# Patient Record
Sex: FEMALE | Race: WHITE | NOT HISPANIC OR LATINO | Employment: OTHER | URBAN - METROPOLITAN AREA
[De-identification: names, ages, dates, MRNs, and addresses within clinical notes are randomized per-mention and may not be internally consistent; named-entity substitution may affect disease eponyms.]

---

## 2023-05-16 ENCOUNTER — TELEPHONE (OUTPATIENT)
Dept: PRIMARY CARE | Facility: CLINIC | Age: 73
End: 2023-05-16
Payer: COMMERCIAL

## 2023-05-16 DIAGNOSIS — R06.02 SOB (SHORTNESS OF BREATH): ICD-10-CM

## 2023-05-17 RX ORDER — ALBUTEROL SULFATE 90 UG/1
2 AEROSOL, METERED RESPIRATORY (INHALATION) EVERY 6 HOURS PRN
Qty: 18 G | Refills: 0 | Status: SHIPPED | OUTPATIENT
Start: 2023-05-17 | End: 2023-07-25 | Stop reason: SDUPTHER

## 2023-05-17 RX ORDER — ALBUTEROL SULFATE 90 UG/1
AEROSOL, METERED RESPIRATORY (INHALATION)
COMMUNITY
Start: 2022-06-15 | End: 2023-05-17 | Stop reason: SDUPTHER

## 2023-06-01 ENCOUNTER — TELEPHONE (OUTPATIENT)
Dept: PRIMARY CARE | Facility: CLINIC | Age: 73
End: 2023-06-01

## 2023-06-04 DIAGNOSIS — Z00.00 HEALTH CARE MAINTENANCE: ICD-10-CM

## 2023-06-04 DIAGNOSIS — E07.9 THYROID DISORDER: ICD-10-CM

## 2023-06-05 RX ORDER — GABAPENTIN 300 MG/1
900 CAPSULE ORAL 3 TIMES DAILY
COMMUNITY
End: 2023-06-05 | Stop reason: SDUPTHER

## 2023-06-06 RX ORDER — GABAPENTIN 300 MG/1
900 CAPSULE ORAL 3 TIMES DAILY
Qty: 810 CAPSULE | Refills: 0 | Status: SHIPPED | OUTPATIENT
Start: 2023-06-06 | End: 2023-06-23 | Stop reason: SDUPTHER

## 2023-06-06 RX ORDER — LEVOTHYROXINE SODIUM 75 UG/1
TABLET ORAL
Qty: 90 TABLET | Refills: 0 | Status: SHIPPED | OUTPATIENT
Start: 2023-06-06 | End: 2023-08-31

## 2023-06-23 DIAGNOSIS — Z00.00 HEALTH CARE MAINTENANCE: ICD-10-CM

## 2023-06-23 RX ORDER — GABAPENTIN 300 MG/1
300 CAPSULE ORAL 3 TIMES DAILY
Qty: 270 CAPSULE | Refills: 0 | Status: SHIPPED | OUTPATIENT
Start: 2023-06-23 | End: 2023-10-05 | Stop reason: SDUPTHER

## 2023-07-25 ENCOUNTER — OFFICE VISIT (OUTPATIENT)
Dept: PRIMARY CARE | Facility: CLINIC | Age: 73
End: 2023-07-25
Payer: COMMERCIAL

## 2023-07-25 VITALS
WEIGHT: 115 LBS | BODY MASS INDEX: 17.43 KG/M2 | OXYGEN SATURATION: 94 % | HEIGHT: 68 IN | HEART RATE: 68 BPM | SYSTOLIC BLOOD PRESSURE: 80 MMHG | TEMPERATURE: 96.9 F | DIASTOLIC BLOOD PRESSURE: 65 MMHG

## 2023-07-25 DIAGNOSIS — J43.2 CENTRILOBULAR EMPHYSEMA (MULTI): ICD-10-CM

## 2023-07-25 DIAGNOSIS — I48.20 CHRONIC ATRIAL FIBRILLATION (MULTI): ICD-10-CM

## 2023-07-25 DIAGNOSIS — Z00.00 VISIT FOR PREVENTIVE HEALTH EXAMINATION: Primary | ICD-10-CM

## 2023-07-25 DIAGNOSIS — I50.32 CHRONIC DIASTOLIC CONGESTIVE HEART FAILURE (MULTI): ICD-10-CM

## 2023-07-25 DIAGNOSIS — R63.4 WEIGHT LOSS: ICD-10-CM

## 2023-07-25 DIAGNOSIS — R06.02 SOB (SHORTNESS OF BREATH): ICD-10-CM

## 2023-07-25 DIAGNOSIS — E03.9 HYPOTHYROIDISM, UNSPECIFIED TYPE: ICD-10-CM

## 2023-07-25 PROBLEM — M54.16 LEFT LUMBAR RADICULOPATHY: Status: ACTIVE | Noted: 2023-07-25

## 2023-07-25 PROBLEM — F41.8 ANXIETY ASSOCIATED WITH DEPRESSION: Status: ACTIVE | Noted: 2023-07-25

## 2023-07-25 PROBLEM — K21.9 GERD (GASTROESOPHAGEAL REFLUX DISEASE): Status: ACTIVE | Noted: 2023-07-25

## 2023-07-25 PROBLEM — I50.30 DIASTOLIC CHF (MULTI): Status: ACTIVE | Noted: 2023-07-25

## 2023-07-25 PROBLEM — E78.2 HYPERLIPIDEMIA, MIXED: Status: ACTIVE | Noted: 2023-07-25

## 2023-07-25 PROBLEM — F17.200 NICOTINE DEPENDENCE: Status: ACTIVE | Noted: 2023-07-25

## 2023-07-25 PROBLEM — M51.26 LUMBAR HERNIATED DISC: Status: ACTIVE | Noted: 2023-07-25

## 2023-07-25 PROBLEM — E55.9 MILD VITAMIN D DEFICIENCY: Status: ACTIVE | Noted: 2023-07-25

## 2023-07-25 PROBLEM — I10 BENIGN ESSENTIAL HYPERTENSION: Status: ACTIVE | Noted: 2023-07-25

## 2023-07-25 PROCEDURE — 3078F DIAST BP <80 MM HG: CPT | Performed by: FAMILY MEDICINE

## 2023-07-25 PROCEDURE — 99214 OFFICE O/P EST MOD 30 MIN: CPT | Performed by: FAMILY MEDICINE

## 2023-07-25 PROCEDURE — 1170F FXNL STATUS ASSESSED: CPT | Performed by: FAMILY MEDICINE

## 2023-07-25 PROCEDURE — G0439 PPPS, SUBSEQ VISIT: HCPCS | Performed by: FAMILY MEDICINE

## 2023-07-25 PROCEDURE — 4004F PT TOBACCO SCREEN RCVD TLK: CPT | Performed by: FAMILY MEDICINE

## 2023-07-25 PROCEDURE — 3074F SYST BP LT 130 MM HG: CPT | Performed by: FAMILY MEDICINE

## 2023-07-25 PROCEDURE — 1160F RVW MEDS BY RX/DR IN RCRD: CPT | Performed by: FAMILY MEDICINE

## 2023-07-25 PROCEDURE — 1159F MED LIST DOCD IN RCRD: CPT | Performed by: FAMILY MEDICINE

## 2023-07-25 PROCEDURE — 1157F ADVNC CARE PLAN IN RCRD: CPT | Performed by: FAMILY MEDICINE

## 2023-07-25 PROCEDURE — 99497 ADVNCD CARE PLAN 30 MIN: CPT | Performed by: FAMILY MEDICINE

## 2023-07-25 RX ORDER — CHLORTHALIDONE 25 MG/1
1 TABLET ORAL DAILY
COMMUNITY
Start: 2021-03-04 | End: 2023-07-27 | Stop reason: ALTCHOICE

## 2023-07-25 RX ORDER — ALBUTEROL SULFATE 90 UG/1
2 AEROSOL, METERED RESPIRATORY (INHALATION) EVERY 6 HOURS PRN
Qty: 18 G | Refills: 0 | Status: SHIPPED | OUTPATIENT
Start: 2023-07-25 | End: 2024-01-03 | Stop reason: ALTCHOICE

## 2023-07-25 RX ORDER — MIRTAZAPINE 7.5 MG/1
7.5 TABLET, FILM COATED ORAL NIGHTLY
Qty: 30 TABLET | Refills: 11 | Status: SHIPPED | OUTPATIENT
Start: 2023-07-25 | End: 2024-01-03 | Stop reason: ALTCHOICE

## 2023-07-25 RX ORDER — METOPROLOL SUCCINATE 25 MG/1
1 TABLET, EXTENDED RELEASE ORAL DAILY
COMMUNITY
Start: 2020-02-17 | End: 2023-12-20

## 2023-07-25 RX ORDER — ATORVASTATIN CALCIUM 40 MG/1
1 TABLET, FILM COATED ORAL DAILY
COMMUNITY
Start: 2018-01-18 | End: 2023-10-02

## 2023-07-25 RX ORDER — BIOTIN 10 MG
TABLET ORAL
COMMUNITY
Start: 2021-09-16

## 2023-07-25 RX ORDER — NEOMYCIN SULFATE, POLYMYXIN B SULFATE, HYDROCORTISONE 3.5; 10000; 1 MG/ML; [USP'U]/ML; MG/ML
2 SOLUTION/ DROPS AURICULAR (OTIC) 4 TIMES DAILY
Qty: 10 ML | Refills: 0 | Status: SHIPPED | OUTPATIENT
Start: 2023-07-25 | End: 2023-08-01

## 2023-07-25 RX ORDER — NALOXONE HYDROCHLORIDE 4 MG/.1ML
SPRAY NASAL
COMMUNITY
Start: 2022-04-07

## 2023-07-25 RX ORDER — ACETAMINOPHEN 500 MG
TABLET ORAL
COMMUNITY
Start: 2021-02-16

## 2023-07-25 RX ORDER — APIXABAN 5 MG/1
5 TABLET, FILM COATED ORAL 2 TIMES DAILY
COMMUNITY
End: 2023-08-31 | Stop reason: SDUPTHER

## 2023-07-25 RX ORDER — ESCITALOPRAM OXALATE 20 MG/1
1 TABLET ORAL DAILY
COMMUNITY
Start: 2018-01-18 | End: 2024-01-03 | Stop reason: ALTCHOICE

## 2023-07-25 RX ORDER — OXYCODONE AND ACETAMINOPHEN 10; 325 MG/1; MG/1
TABLET ORAL
COMMUNITY
End: 2023-10-05 | Stop reason: SDUPTHER

## 2023-07-25 RX ORDER — LISINOPRIL 40 MG/1
1 TABLET ORAL DAILY
COMMUNITY
End: 2023-07-27 | Stop reason: ALTCHOICE

## 2023-07-25 RX ORDER — PANTOPRAZOLE SODIUM 40 MG/1
1 TABLET, DELAYED RELEASE ORAL DAILY
COMMUNITY
Start: 2020-02-17 | End: 2023-12-20

## 2023-07-25 ASSESSMENT — ACTIVITIES OF DAILY LIVING (ADL)
GROCERY_SHOPPING: INDEPENDENT
DRESSING: INDEPENDENT
BATHING: INDEPENDENT
DOING_HOUSEWORK: INDEPENDENT
MANAGING_FINANCES: INDEPENDENT
TAKING_MEDICATION: INDEPENDENT

## 2023-07-25 ASSESSMENT — LIFESTYLE VARIABLES
HOW MANY STANDARD DRINKS CONTAINING ALCOHOL DO YOU HAVE ON A TYPICAL DAY: PATIENT DOES NOT DRINK
HOW OFTEN DO YOU HAVE A DRINK CONTAINING ALCOHOL: NEVER
HOW OFTEN DO YOU HAVE SIX OR MORE DRINKS ON ONE OCCASION: NEVER
AUDIT-C TOTAL SCORE: 0
SKIP TO QUESTIONS 9-10: 1

## 2023-07-25 ASSESSMENT — PATIENT HEALTH QUESTIONNAIRE - PHQ9
1. LITTLE INTEREST OR PLEASURE IN DOING THINGS: NOT AT ALL
SUM OF ALL RESPONSES TO PHQ9 QUESTIONS 1 AND 2: 0
2. FEELING DOWN, DEPRESSED OR HOPELESS: NOT AT ALL

## 2023-07-25 ASSESSMENT — ENCOUNTER SYMPTOMS
LOSS OF SENSATION IN FEET: 0
OCCASIONAL FEELINGS OF UNSTEADINESS: 1
DEPRESSION: 0

## 2023-07-25 NOTE — PROGRESS NOTES
"Subjective   Reason for Visit: Abi Weston is an 73 y.o. female here for a Medicare Wellness visit.     Past Medical, Surgical, and Family History reviewed and updated in chart.    Reviewed all medications by prescribing practitioner or clinical pharmacist (such as prescriptions, OTCs, herbal therapies and supplements) and documented in the medical record.    HPI    Patient Care Team:  Jhonathan Aquino MD as PCP - General     Review of Systems    Objective   Vitals:  BP 80/65 (BP Location: Left arm, Patient Position: Sitting)   Pulse 68   Temp 36.1 °C (96.9 °F)   Ht 1.727 m (5' 8\")   Wt 52.2 kg (115 lb)   SpO2 94%   BMI 17.49 kg/m²       Physical Exam    Assessment/Plan   Problem List Items Addressed This Visit       Chronic atrial fibrillation (CMS/HCC)    Relevant Medications    metoprolol succinate XL (Toprol-XL) 25 mg 24 hr tablet    Diastolic CHF (CMS/HCC)    Relevant Medications    metoprolol succinate XL (Toprol-XL) 25 mg 24 hr tablet    Other Relevant Orders    Urinalysis Microscopic Only    TSH with reflex to Free T4 if abnormal    Lipid Panel    Comprehensive Metabolic Panel    CBC    Hypothyroidism    Relevant Orders    TSH with reflex to Free T4 if abnormal    Lipid Panel     Other Visit Diagnoses       Visit for preventive health examination    -  Primary    Relevant Orders    Urinalysis Microscopic Only    TSH with reflex to Free T4 if abnormal    Lipid Panel    Comprehensive Metabolic Panel    CBC    SOB (shortness of breath)        Relevant Medications    albuterol 90 mcg/actuation inhaler    Centrilobular emphysema (CMS/HCC)        Relevant Medications    albuterol 90 mcg/actuation inhaler               "

## 2023-07-25 NOTE — PROGRESS NOTES
Subjective   Patient ID: Abi Weston is a 73 y.o. female who presents for Medicare Annual Wellness Visit Subsequent.    Assessment/Plan     Problem List Items Addressed This Visit       Chronic atrial fibrillation (CMS/HCC)    Relevant Medications    metoprolol succinate XL (Toprol-XL) 25 mg 24 hr tablet    Diastolic CHF (CMS/HCC)    Relevant Medications    metoprolol succinate XL (Toprol-XL) 25 mg 24 hr tablet    Other Relevant Orders    Urinalysis Microscopic Only    TSH with reflex to Free T4 if abnormal    Lipid Panel    Comprehensive Metabolic Panel    CBC    Hypothyroidism    Relevant Orders    TSH with reflex to Free T4 if abnormal    Lipid Panel     Other Visit Diagnoses       Visit for preventive health examination    -  Primary    Relevant Medications    neomycin-polymyxin-HC (Cortisporin) otic solution    Other Relevant Orders    Urinalysis Microscopic Only    TSH with reflex to Free T4 if abnormal    Lipid Panel    Comprehensive Metabolic Panel    CBC    SOB (shortness of breath)        Relevant Medications    albuterol 90 mcg/actuation inhaler    Centrilobular emphysema (CMS/HCC)        Relevant Medications    albuterol 90 mcg/actuation inhaler    Weight loss        Relevant Medications    mirtazapine (Remeron) 7.5 mg tablet        Continue current medication  Hold Lasix  Follow-up with cardiology  Advised to stay away from smoking  Continue atorvastatin 40 mg  Follow-up in 4 months  Mammogram April 2022 within normal limits  Scan showed osteopenia in April 2022  Declined flu vaccine      Up-to-date with pneumonia vaccine  New shingles vaccine patient declined for now  Colonoscopy January 2019 and repeat in 5 years hyperplastic polyps  Follow-up in 6 months  Come back early with any new sign and symptoms patient understands and agreement plan.     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 Rossi  Status post lumbar spinal fusion and decompression - was seen by neurosurgery Dr. Ramos - history of spinal surgery Ã--2 - following up with pain management  Chronic pain syndrome - lower back pain - following up with pain management  Osteoarthritis  Status post right carpal tunnel surgery feeling better  GERD  Diastolic CHF     Started smoking again advised patient to stop smoking  Following up with cardiology and pain management  Still smoking     Left ear itching  Complaining of weight loss we will consider low-dose of Remeron 7.5 mg risk-benefit discussed  Advance directives:. Advanced Care Planning discussed and documented advance care plan or surrogate decision maker documented in the medical record.   A Conversation about Advance directives of at least 16 minutes has occurred. Actual time spent: I spent >16 minutes discussing Advance Care Planning including the explanation and discussion of advance directives. If patient does not have current up to date documents, examples and information provided on how to create both Living Will and Power of . Patient was encouraged to work on completing these documents.  Conversation with: The conversation with the patient and/or participants was voluntary.  Documents discussed and/or completed:  Living Will was discussed with participants. Healthcare POA was discussed with participants. Patient educated on how to obtain Living Will and Power of . Discussed patient end of life choices.   Patient has living will and patient's daughter liudmila is healthcare power of .  Patient is full code.      No Known Allergies    Current Outpatient Medications   Medication Sig Dispense Refill    atorvastatin (Lipitor) 40 mg tablet Take 1 tablet (40 mg) by mouth once daily.      biotin 10 mg tablet Take by mouth.      chlorthalidone (Hygroton) 25 mg tablet Take 1 tablet (25 mg) by mouth once daily.      cholecalciferol (Vitamin D-3) 50 mcg (2,000 unit) capsule Take by mouth.      Eliquis 5 mg tablet Take 1 tablet (5 mg) by mouth 2 times a day.      escitalopram (Lexapro) 20 mg tablet Take 1 tablet (20 mg) by mouth once daily.      gabapentin (Neurontin) 300 mg capsule Take 1 capsule (300 mg) by mouth 3 times a day. 270 capsule 0    levothyroxine (Synthroid, Levoxyl) 75 mcg tablet TAKE 1 TABLET DAILY AS     DIRECTED 90 tablet 0    lisinopril 40 mg tablet Take 1 tablet (40 mg) by mouth once daily.      metoprolol succinate XL (Toprol-XL) 25 mg 24 hr tablet Take 1 tablet (25 mg) by mouth once daily.      naloxone (Narcan) 4 mg/0.1 mL nasal spray Administer into affected nostril(s).      oxyCODONE-acetaminophen (Percocet)  mg tablet TAKE ONE TABLET BY MOUTH FOUR TIMES A DAY as needed for pain. max of 4 tablets a day.      pantoprazole (ProtoNix) 40 mg EC tablet Take 1 tablet (40 mg) by mouth once daily.      albuterol 90 mcg/actuation inhaler Inhale 2 puffs every 6 hours if needed for wheezing or shortness of breath. 18 g 0    mirtazapine (Remeron) 7.5 mg tablet Take 1 tablet (7.5 mg) by mouth once daily at bedtime. 30 tablet 11    neomycin-polymyxin-HC (Cortisporin) otic solution Administer 2 drops into the left ear 4 times a day for 7 days. 10 mL 0     No current facility-administered medications for  "this visit.       Objective   Visit Vitals  BP 80/65 (BP Location: Left arm, Patient Position: Sitting)   Pulse 68   Temp 36.1 °C (96.9 °F)   Ht 1.727 m (5' 8\")   Wt 52.2 kg (115 lb)   SpO2 94%   BMI 17.49 kg/m²   Smoking Status Every Day   BSA 1.58 m²     Physical Exam  Constitutional:       General: He is not in acute distress.     Appearance: Normal appearance.   HENT:      Head: Normocephalic and atraumatic.      Nose: Nose normal.   Eyes:      Extraocular Movements: Extraocular movements intact.      Conjunctiva/sclera: Conjunctivae normal.   Cardiovascular:      Rate and Rhythm: Normal rate and regular rhythm.   Pulmonary:      Effort: Pulmonary effort is normal.      Breath sounds: Normal breath sounds.   Skin:     General: Skin is warm.   Neurological:      Mental Status: He is alert and oriented to person, place, and time.   Psychiatric:         Mood and Affect: Mood normal.         Behavior: Behavior normal.   Immunization History   Administered Date(s) Administered    Moderna SARS-CoV-2 Vaccination 03/03/2021, 03/31/2021       Review of Systems    No visits with results within 4 Month(s) from this visit.   Latest known visit with results is:   Legacy Encounter on 01/19/2023   Component Date Value Ref Range Status    DRUG SCREEN COMMENT URINE 01/19/2023 SEE BELOW   Final    Creatine, Urine 01/19/2023 125.5  mg/dL Final    Amphetamine Screen, Urine 01/19/2023 PRESUMPTIVE NEGATIVE  NEGATIVE Final    Barbiturate Screen, Urine 01/19/2023 PRESUMPTIVE NEGATIVE  NEGATIVE Final    Cannabinoid Screen, Urine 01/19/2023 PRESUMPTIVE NEGATIVE  NEGATIVE Final    Cocaine Screen, Urine 01/19/2023 PRESUMPTIVE NEGATIVE  NEGATIVE Final    PCP Screen, Urine 01/19/2023 PRESUMPTIVE NEGATIVE  NEGATIVE Final    7-Aminoclonazepam 01/19/2023 <25  Cutoff <25 ng/mL Final    Alpha-Hydroxyalprazolam 01/19/2023 <25  Cutoff <25 ng/mL Final    Alpha-Hydroxymidazolam 01/19/2023 <25  Cutoff <25 ng/mL Final    Alprazolam 01/19/2023 <25  " Cutoff <25 ng/mL Final    Chlordiazepoxide 01/19/2023 <25  Cutoff <25 ng/mL Final    Clonazepam 01/19/2023 <25  Cutoff <25 ng/mL Final    Diazepam 01/19/2023 <25  Cutoff <25 ng/mL Final    Lorazepam 01/19/2023 <25  Cutoff <25 ng/mL Final    Midazolam 01/19/2023 <25  Cutoff <25 ng/mL Final    Nordiazepam 01/19/2023 <25  Cutoff <25 ng/mL Final    Oxazepam 01/19/2023 <25  Cutoff <25 ng/mL Final    Temazepam 01/19/2023 <25  Cutoff <25 ng/mL Final    Zolpidem 01/19/2023 <25  Cutoff <25 ng/mL Final    Zolpidem Metabolite (ZCA) 01/19/2023 <25  Cutoff <25 ng/mL Final    6-Acetylmorphine 01/19/2023 <25  Cutoff <25 ng/mL Final    Codeine 01/19/2023 <50  Cutoff <50 ng/mL Final    Hydrocodone 01/19/2023 <25  Cutoff <25 ng/mL Final    Hydromorphone 01/19/2023 <25  Cutoff <25 ng/mL Final    Morphine Urine 01/19/2023 <50  Cutoff <50 ng/mL Final    Norhydrocodone 01/19/2023 <25  Cutoff <25 ng/mL Final    Noroxycodone 01/19/2023 >1000 (A)  Cutoff <25 ng/mL Final    Oxycodone 01/19/2023 1,262 (A)  Cutoff <25 ng/mL Final    Oxymorphone 01/19/2023 31 (A)  Cutoff <25 ng/mL Final    Tramadol 01/19/2023 <50  Cutoff <50 ng/mL Final    O-Desmethyltramadol 01/19/2023 <50  Cutoff <50 ng/mL Final    Fentanyl 01/19/2023 <2.5  Cutoff<2.5 ng/mL Final    Norfentanyl 01/19/2023 <2.5  Cutoff<2.5 ng/mL Final    METHADONE CONFIRMATION,URINE 01/19/2023 <25  Cutoff <25 ng/mL Final    EDDP 01/19/2023 <25  Cutoff <25 ng/mL Final       Radiology: Reviewed imaging in powerchart.  No results found.    No family history on file.  Social History     Socioeconomic History    Marital status:      Spouse name: None    Number of children: None    Years of education: None    Highest education level: None   Occupational History    None   Tobacco Use    Smoking status: Every Day     Packs/day: 0.50     Years: 30.00     Total pack years: 15.00     Types: Cigarettes    Smokeless tobacco: Never   Substance and Sexual Activity    Alcohol use: Never    Drug use:  Yes     Types: Codeine    Sexual activity: None   Other Topics Concern    None   Social History Narrative    None     Social Determinants of Health     Financial Resource Strain: Not on file   Food Insecurity: Not on file   Transportation Needs: Not on file   Physical Activity: Not on file   Stress: Not on file   Social Connections: Not on file   Intimate Partner Violence: Not on file   Housing Stability: Not on file     Past Medical History:   Diagnosis Date    Personal history of other diseases of the circulatory system     History of hypertension     Past Surgical History:   Procedure Laterality Date    BACK SURGERY  01/23/2018    Back Surgery    COLON SURGERY  07/09/2014    Colon Surgery    OTHER SURGICAL HISTORY  02/11/2019    Endoscopy       Charting was completed using voice recognition technology and may include unintended errors.

## 2023-07-27 ENCOUNTER — OFFICE VISIT (OUTPATIENT)
Dept: PRIMARY CARE | Facility: CLINIC | Age: 73
End: 2023-07-27
Payer: COMMERCIAL

## 2023-07-27 VITALS
SYSTOLIC BLOOD PRESSURE: 111 MMHG | OXYGEN SATURATION: 92 % | HEIGHT: 68 IN | HEART RATE: 63 BPM | TEMPERATURE: 97.5 F | BODY MASS INDEX: 17.49 KG/M2 | DIASTOLIC BLOOD PRESSURE: 80 MMHG

## 2023-07-27 DIAGNOSIS — I10 BENIGN ESSENTIAL HYPERTENSION: Primary | ICD-10-CM

## 2023-07-27 PROCEDURE — 1159F MED LIST DOCD IN RCRD: CPT | Performed by: FAMILY MEDICINE

## 2023-07-27 PROCEDURE — 3074F SYST BP LT 130 MM HG: CPT | Performed by: FAMILY MEDICINE

## 2023-07-27 PROCEDURE — 1157F ADVNC CARE PLAN IN RCRD: CPT | Performed by: FAMILY MEDICINE

## 2023-07-27 PROCEDURE — 1160F RVW MEDS BY RX/DR IN RCRD: CPT | Performed by: FAMILY MEDICINE

## 2023-07-27 PROCEDURE — 3079F DIAST BP 80-89 MM HG: CPT | Performed by: FAMILY MEDICINE

## 2023-07-27 PROCEDURE — 99213 OFFICE O/P EST LOW 20 MIN: CPT | Performed by: FAMILY MEDICINE

## 2023-07-27 PROCEDURE — 4004F PT TOBACCO SCREEN RCVD TLK: CPT | Performed by: FAMILY MEDICINE

## 2023-07-27 NOTE — PROGRESS NOTES
Subjective   Patient ID: Abi Weston is a 73 y.o. female who presents for Follow-up.    Assessment/Plan     Problem List Items Addressed This Visit       Benign essential hypertension - Primary   Continue current medication  Hold Lasix  Follow-up with cardiology  Advised to stay away from smoking  Continue atorvastatin 40 mg  Follow-up in 4 months  Mammogram April 2022 within normal limits  Scan showed osteopenia in April 2022  Declined flu vaccine      Up-to-date with pneumonia vaccine  New shingles vaccine patient declined for now  Colonoscopy January 2019 and repeat in 5 years hyperplastic polyps  Follow-up in 6 months  Come back early with any new sign and symptoms patient understands and agreement plan.     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 Rossi  Status post lumbar spinal fusion and decompression - was seen by neurosurgery Dr. Ramos - history of spinal surgery Ã--2 - following up with pain management  Chronic pain syndrome - lower back pain - following up with pain management  Osteoarthritis  Status post right carpal tunnel surgery feeling better  GERD  Diastolic CHF     Started smoking again advised patient to stop smoking  Following up with cardiology and pain management  Still smoking     Left ear itching  Complaining of weight loss we will consider low-dose of Remeron 7.5 mg risk-benefit  discussed  Advance directives:. Advanced Care Planning discussed and documented advance care plan or surrogate decision maker documented in the medical record.   A Conversation about Advance directives of at least 16 minutes has occurred. Actual time spent: I spent >16 minutes discussing Advance Care Planning including the explanation and discussion of advance directives. If patient does not have current up to date documents, examples and information provided on how to create both Living Will and Power of . Patient was encouraged to work on completing these documents.  Conversation with: The conversation with the patient and/or participants was voluntary.  Documents discussed and/or completed: Living Will was discussed with participants. Healthcare POA was discussed with participants. Patient educated on how to obtain Living Will and Power of . Discussed patient end of life choices.   Patient has living will and patient's daughter liudmila is healthcare power of .  Patient is full code.      No Known Allergies    Current Outpatient Medications   Medication Sig Dispense Refill    albuterol 90 mcg/actuation inhaler Inhale 2 puffs every 6 hours if needed for wheezing or shortness of breath. 18 g 0    atorvastatin (Lipitor) 40 mg tablet Take 1 tablet (40 mg) by mouth once daily.      biotin 10 mg tablet Take by mouth.      cholecalciferol (Vitamin D-3) 50 mcg (2,000 unit) capsule Take by mouth.      Eliquis 5 mg tablet Take 1 tablet (5 mg) by mouth 2 times a day.      escitalopram (Lexapro) 20 mg tablet Take 1 tablet (20 mg) by mouth once daily.      gabapentin (Neurontin) 300 mg capsule Take 1 capsule (300 mg) by mouth 3 times a day. 270 capsule 0    levothyroxine (Synthroid, Levoxyl) 75 mcg tablet TAKE 1 TABLET DAILY AS     DIRECTED 90 tablet 0    metoprolol succinate XL (Toprol-XL) 25 mg 24 hr tablet Take 1 tablet (25 mg) by mouth once daily.      naloxone (Narcan) 4 mg/0.1 mL nasal spray  "Administer into affected nostril(s).      neomycin-polymyxin-HC (Cortisporin) otic solution Administer 2 drops into the left ear 4 times a day for 7 days. 10 mL 0    oxyCODONE-acetaminophen (Percocet)  mg tablet TAKE ONE TABLET BY MOUTH FOUR TIMES A DAY as needed for pain. max of 4 tablets a day.      pantoprazole (ProtoNix) 40 mg EC tablet Take 1 tablet (40 mg) by mouth once daily.      mirtazapine (Remeron) 7.5 mg tablet Take 1 tablet (7.5 mg) by mouth once daily at bedtime. (Patient not taking: Reported on 7/27/2023) 30 tablet 11     No current facility-administered medications for this visit.       Objective   Visit Vitals  /80 (BP Location: Left arm, Patient Position: Sitting)   Pulse 63   Temp 36.4 °C (97.5 °F)   Ht 1.727 m (5' 8\")   SpO2 92%   BMI 17.49 kg/m²   Smoking Status Every Day   BSA 1.58 m²     Physical Exam  Constitutional:       General: He is not in acute distress.     Appearance: Normal appearance.   HENT:      Head: Normocephalic and atraumatic.      Nose: Nose normal.   Eyes:      Extraocular Movements: Extraocular movements intact.      Conjunctiva/sclera: Conjunctivae normal.   Cardiovascular:      Rate and Rhythm: Normal rate and regular rhythm.   Pulmonary:      Effort: Pulmonary effort is normal.      Breath sounds: Normal breath sounds.   Skin:     General: Skin is warm.   Neurological:      Mental Status: He is alert and oriented to person, place, and time.   Psychiatric:         Mood and Affect: Mood normal.         Behavior: Behavior normal.   Immunization History   Administered Date(s) Administered    Moderna SARS-CoV-2 Vaccination 03/03/2021, 03/31/2021       Review of Systems    No visits with results within 4 Month(s) from this visit.   Latest known visit with results is:   Legacy Encounter on 01/19/2023   Component Date Value Ref Range Status    DRUG SCREEN COMMENT URINE 01/19/2023 SEE BELOW   Final    Creatine, Urine 01/19/2023 125.5  mg/dL Final    Amphetamine " Screen, Urine 01/19/2023 PRESUMPTIVE NEGATIVE  NEGATIVE Final    Barbiturate Screen, Urine 01/19/2023 PRESUMPTIVE NEGATIVE  NEGATIVE Final    Cannabinoid Screen, Urine 01/19/2023 PRESUMPTIVE NEGATIVE  NEGATIVE Final    Cocaine Screen, Urine 01/19/2023 PRESUMPTIVE NEGATIVE  NEGATIVE Final    PCP Screen, Urine 01/19/2023 PRESUMPTIVE NEGATIVE  NEGATIVE Final    7-Aminoclonazepam 01/19/2023 <25  Cutoff <25 ng/mL Final    Alpha-Hydroxyalprazolam 01/19/2023 <25  Cutoff <25 ng/mL Final    Alpha-Hydroxymidazolam 01/19/2023 <25  Cutoff <25 ng/mL Final    Alprazolam 01/19/2023 <25  Cutoff <25 ng/mL Final    Chlordiazepoxide 01/19/2023 <25  Cutoff <25 ng/mL Final    Clonazepam 01/19/2023 <25  Cutoff <25 ng/mL Final    Diazepam 01/19/2023 <25  Cutoff <25 ng/mL Final    Lorazepam 01/19/2023 <25  Cutoff <25 ng/mL Final    Midazolam 01/19/2023 <25  Cutoff <25 ng/mL Final    Nordiazepam 01/19/2023 <25  Cutoff <25 ng/mL Final    Oxazepam 01/19/2023 <25  Cutoff <25 ng/mL Final    Temazepam 01/19/2023 <25  Cutoff <25 ng/mL Final    Zolpidem 01/19/2023 <25  Cutoff <25 ng/mL Final    Zolpidem Metabolite (ZCA) 01/19/2023 <25  Cutoff <25 ng/mL Final    6-Acetylmorphine 01/19/2023 <25  Cutoff <25 ng/mL Final    Codeine 01/19/2023 <50  Cutoff <50 ng/mL Final    Hydrocodone 01/19/2023 <25  Cutoff <25 ng/mL Final    Hydromorphone 01/19/2023 <25  Cutoff <25 ng/mL Final    Morphine Urine 01/19/2023 <50  Cutoff <50 ng/mL Final    Norhydrocodone 01/19/2023 <25  Cutoff <25 ng/mL Final    Noroxycodone 01/19/2023 >1000 (A)  Cutoff <25 ng/mL Final    Oxycodone 01/19/2023 1,262 (A)  Cutoff <25 ng/mL Final    Oxymorphone 01/19/2023 31 (A)  Cutoff <25 ng/mL Final    Tramadol 01/19/2023 <50  Cutoff <50 ng/mL Final    O-Desmethyltramadol 01/19/2023 <50  Cutoff <50 ng/mL Final    Fentanyl 01/19/2023 <2.5  Cutoff<2.5 ng/mL Final    Norfentanyl 01/19/2023 <2.5  Cutoff<2.5 ng/mL Final    METHADONE CONFIRMATION,URINE 01/19/2023 <25  Cutoff <25 ng/mL Final     EDDP 01/19/2023 <25  Cutoff <25 ng/mL Final       Radiology: Reviewed imaging in powerchart.  No results found.    No family history on file.  Social History     Socioeconomic History    Marital status:      Spouse name: None    Number of children: None    Years of education: None    Highest education level: None   Occupational History    None   Tobacco Use    Smoking status: Every Day     Packs/day: 0.50     Years: 30.00     Total pack years: 15.00     Types: Cigarettes    Smokeless tobacco: Never   Substance and Sexual Activity    Alcohol use: Never    Drug use: Yes     Types: Codeine    Sexual activity: None   Other Topics Concern    None   Social History Narrative    None     Social Determinants of Health     Financial Resource Strain: Not on file   Food Insecurity: Not on file   Transportation Needs: Not on file   Physical Activity: Not on file   Stress: Not on file   Social Connections: Not on file   Intimate Partner Violence: Not on file   Housing Stability: Not on file     Past Medical History:   Diagnosis Date    Personal history of other diseases of the circulatory system     History of hypertension     Past Surgical History:   Procedure Laterality Date    BACK SURGERY  01/23/2018    Back Surgery    COLON SURGERY  07/09/2014    Colon Surgery    OTHER SURGICAL HISTORY  02/11/2019    Endoscopy       Charting was completed using voice recognition technology and may include unintended errors.

## 2023-08-27 DIAGNOSIS — I48.20 CHRONIC ATRIAL FIBRILLATION (MULTI): ICD-10-CM

## 2023-08-27 DIAGNOSIS — E07.9 THYROID DISORDER: ICD-10-CM

## 2023-08-31 RX ORDER — APIXABAN 5 MG/1
5 TABLET, FILM COATED ORAL 2 TIMES DAILY
Qty: 180 TABLET | Refills: 2 | Status: SHIPPED | OUTPATIENT
Start: 2023-08-31 | End: 2023-09-06 | Stop reason: SDUPTHER

## 2023-08-31 RX ORDER — LEVOTHYROXINE SODIUM 75 UG/1
TABLET ORAL
Qty: 90 TABLET | Refills: 2 | Status: SHIPPED | OUTPATIENT
Start: 2023-08-31

## 2023-09-06 ENCOUNTER — TELEPHONE (OUTPATIENT)
Dept: PRIMARY CARE | Facility: CLINIC | Age: 73
End: 2023-09-06
Payer: COMMERCIAL

## 2023-09-06 DIAGNOSIS — I48.20 CHRONIC ATRIAL FIBRILLATION (MULTI): ICD-10-CM

## 2023-09-06 NOTE — TELEPHONE ENCOUNTER
PT said her Eliquis 5 mg was sent to [facility].  Should go to [facility].      Hasn't had her Eliquis for a week.

## 2023-09-29 PROBLEM — M54.6 PAIN IN THORACIC SPINE: Status: ACTIVE | Noted: 2023-09-29

## 2023-09-29 PROBLEM — J44.9 CHRONIC OBSTRUCTIVE PULMONARY DISEASE (MULTI): Status: ACTIVE | Noted: 2023-09-29

## 2023-09-29 PROBLEM — M47.812 CERVICAL SPONDYLOSIS WITHOUT MYELOPATHY: Status: ACTIVE | Noted: 2023-09-29

## 2023-09-29 PROBLEM — G89.18 POSTOPERATIVE PAIN: Status: ACTIVE | Noted: 2023-09-29

## 2023-09-29 PROBLEM — H44.9 DISORDER OF GLOBE: Status: ACTIVE | Noted: 2023-09-29

## 2023-09-29 PROBLEM — M54.30 SCIATICA: Status: ACTIVE | Noted: 2023-09-29

## 2023-09-29 PROBLEM — J98.11 PULMONARY ATELECTASIS: Status: ACTIVE | Noted: 2023-09-29

## 2023-09-29 PROBLEM — I35.8 AORTIC VALVE SCLEROSIS: Status: ACTIVE | Noted: 2023-09-29

## 2023-09-29 PROBLEM — N20.0 CALCULUS OF KIDNEY: Status: ACTIVE | Noted: 2023-09-29

## 2023-09-29 PROBLEM — S93.409A SPRAIN OF ANKLE: Status: ACTIVE | Noted: 2023-09-29

## 2023-09-29 PROBLEM — G89.29 CHRONIC PAIN: Status: ACTIVE | Noted: 2023-09-29

## 2023-09-29 PROBLEM — R20.0 NUMBNESS: Status: ACTIVE | Noted: 2023-09-29

## 2023-09-29 PROBLEM — R10.9 ABDOMINAL PAIN: Status: ACTIVE | Noted: 2023-09-29

## 2023-09-29 PROBLEM — M19.049 OSTEOARTHRITIS OF HAND: Status: ACTIVE | Noted: 2023-09-29

## 2023-09-29 PROBLEM — R45.0 NERVOUSNESS: Status: ACTIVE | Noted: 2023-09-29

## 2023-09-29 PROBLEM — M19.019 OSTEOARTHRITIS OF SHOULDER: Status: ACTIVE | Noted: 2023-09-29

## 2023-09-29 PROBLEM — F41.0 PANIC ATTACK: Status: ACTIVE | Noted: 2023-09-29

## 2023-09-29 PROBLEM — T14.8XXA CONTUSION: Status: ACTIVE | Noted: 2023-09-29

## 2023-09-29 PROBLEM — R79.9 ABNORMAL BLOOD CHEMISTRY LEVEL: Status: ACTIVE | Noted: 2023-09-29

## 2023-09-29 PROBLEM — R10.12 LEFT UPPER QUADRANT ABDOMINAL PAIN: Status: ACTIVE | Noted: 2023-09-29

## 2023-09-29 PROBLEM — K43.2 INCISIONAL HERNIA: Status: ACTIVE | Noted: 2023-09-29

## 2023-09-29 PROBLEM — R25.2 MUSCLE CRAMPS: Status: ACTIVE | Noted: 2023-09-29

## 2023-09-29 PROBLEM — N95.2 ATROPHIC VAGINITIS: Status: ACTIVE | Noted: 2023-09-29

## 2023-09-29 PROBLEM — M15.9 GENERALIZED OSTEOARTHRITIS: Status: ACTIVE | Noted: 2023-09-29

## 2023-09-29 PROBLEM — K52.9 CHRONIC DIARRHEA: Status: ACTIVE | Noted: 2023-09-29

## 2023-09-29 PROBLEM — M25.50 ARTHRALGIA: Status: ACTIVE | Noted: 2023-09-29

## 2023-09-29 PROBLEM — E53.9 VITAMIN B-COMPLEX DEFICIENCY: Status: ACTIVE | Noted: 2023-09-29

## 2023-09-29 PROBLEM — I34.0 MITRAL VALVE INSUFFICIENCY: Status: ACTIVE | Noted: 2023-09-29

## 2023-09-29 PROBLEM — G56.01 CARPAL TUNNEL SYNDROME OF RIGHT WRIST: Status: ACTIVE | Noted: 2023-09-29

## 2023-09-29 PROBLEM — M79.10 MUSCLE PAIN: Status: ACTIVE | Noted: 2023-09-29

## 2023-09-29 PROBLEM — L02.619 ABSCESS OF FOOT: Status: ACTIVE | Noted: 2023-09-29

## 2023-09-29 PROBLEM — T81.9XXA COMPLICATION OF SURGICAL PROCEDURE: Status: ACTIVE | Noted: 2023-09-29

## 2023-09-29 PROBLEM — M70.60 GREATER TROCHANTERIC BURSITIS: Status: ACTIVE | Noted: 2023-09-29

## 2023-09-29 PROBLEM — K57.30 DIVERTICULOSIS OF COLON: Status: ACTIVE | Noted: 2023-09-29

## 2023-09-29 PROBLEM — B02.9 HERPES ZOSTER: Status: ACTIVE | Noted: 2023-09-29

## 2023-09-29 PROBLEM — G47.00 INSOMNIA: Status: ACTIVE | Noted: 2023-09-29

## 2023-09-29 PROBLEM — N95.1 MENOPAUSAL SYMPTOM: Status: ACTIVE | Noted: 2023-09-29

## 2023-09-29 PROBLEM — H60.90 OTITIS EXTERNA: Status: ACTIVE | Noted: 2023-09-29

## 2023-09-29 PROBLEM — M51.36 ANNULAR TEAR OF LUMBAR DISC: Status: ACTIVE | Noted: 2023-09-29

## 2023-09-29 PROBLEM — L98.9 CHANGING SKIN LESION: Status: ACTIVE | Noted: 2023-09-29

## 2023-09-29 PROBLEM — M47.816 LUMBAR SPONDYLOSIS: Status: ACTIVE | Noted: 2023-09-29

## 2023-09-29 PROBLEM — R06.2 WHEEZING: Status: ACTIVE | Noted: 2023-09-29

## 2023-09-29 PROBLEM — R35.0 INCREASED FREQUENCY OF URINATION: Status: ACTIVE | Noted: 2023-09-29

## 2023-09-29 PROBLEM — M50.30 DEGENERATION OF INTERVERTEBRAL DISC OF CERVICAL REGION: Status: ACTIVE | Noted: 2023-09-29

## 2023-09-29 PROBLEM — R07.89 ATYPICAL CHEST PAIN: Status: ACTIVE | Noted: 2023-09-29

## 2023-09-29 PROBLEM — K40.20 BILATERAL INGUINAL HERNIA: Status: ACTIVE | Noted: 2023-09-29

## 2023-09-29 PROBLEM — L29.9 ITCHING OF EAR: Status: ACTIVE | Noted: 2020-09-03

## 2023-09-29 PROBLEM — R73.9 HYPERGLYCEMIA: Status: ACTIVE | Noted: 2023-09-29

## 2023-09-29 PROBLEM — M96.1 FAILED BACK SYNDROME OF LUMBAR SPINE: Status: ACTIVE | Noted: 2023-09-29

## 2023-09-29 PROBLEM — R10.9 FLANK PAIN: Status: ACTIVE | Noted: 2023-09-29

## 2023-09-29 PROBLEM — M54.9 BACK PAIN WITH RADIATION: Status: ACTIVE | Noted: 2023-09-29

## 2023-09-29 PROBLEM — S16.1XXA STRAIN OF NECK MUSCLE: Status: ACTIVE | Noted: 2023-09-29

## 2023-09-29 PROBLEM — J30.9 ALLERGIC RHINITIS: Status: ACTIVE | Noted: 2023-09-29

## 2023-09-29 PROBLEM — R00.0 TACHYCARDIA: Status: ACTIVE | Noted: 2023-09-29

## 2023-09-29 PROBLEM — I35.8 AORTIC HEART MURMUR ON EXAMINATION: Status: ACTIVE | Noted: 2023-09-29

## 2023-09-29 PROBLEM — M46.1 INFLAMMATION OF SACROILIAC JOINT (CMS-HCC): Status: ACTIVE | Noted: 2023-09-29

## 2023-09-29 PROBLEM — H69.90 DYSFUNCTION OF EUSTACHIAN TUBE: Status: ACTIVE | Noted: 2023-09-29

## 2023-09-29 PROBLEM — M71.9 BURSITIS: Status: ACTIVE | Noted: 2023-09-29

## 2023-09-29 PROBLEM — M47.814 THORACIC SPONDYLOSIS: Status: ACTIVE | Noted: 2023-09-29

## 2023-09-29 PROBLEM — M25.559 ARTHRALGIA OF HIP: Status: ACTIVE | Noted: 2023-09-29

## 2023-09-29 PROBLEM — Z79.891 LONG TERM (CURRENT) USE OF OPIATE ANALGESIC: Status: ACTIVE | Noted: 2023-09-29

## 2023-09-29 PROBLEM — R30.0 DYSURIA: Status: ACTIVE | Noted: 2023-09-29

## 2023-09-29 PROBLEM — M51.36 DEGENERATION OF INTERVERTEBRAL DISC OF LUMBAR REGION: Status: ACTIVE | Noted: 2023-09-29

## 2023-09-29 PROBLEM — M25.50 ARTHRALGIA OF MULTIPLE JOINTS: Status: ACTIVE | Noted: 2023-09-29

## 2023-09-29 PROBLEM — K59.00 CONSTIPATION: Status: ACTIVE | Noted: 2023-09-29

## 2023-09-29 PROBLEM — M75.100 ROTATOR CUFF SYNDROME: Status: ACTIVE | Noted: 2023-09-29

## 2023-09-29 PROBLEM — M54.12 CERVICAL RADICULITIS: Status: ACTIVE | Noted: 2023-09-29

## 2023-09-29 PROBLEM — G60.9 IDIOPATHIC PERIPHERAL NEUROPATHY: Status: ACTIVE | Noted: 2023-09-29

## 2023-09-29 PROBLEM — R53.83 FATIGUE: Status: ACTIVE | Noted: 2023-09-29

## 2023-09-29 PROBLEM — I63.9 STROKE, EMBOLIC (MULTI): Status: ACTIVE | Noted: 2023-09-29

## 2023-09-29 PROBLEM — R00.2 PALPITATIONS: Status: ACTIVE | Noted: 2023-09-29

## 2023-09-29 PROBLEM — M79.609 PAIN IN EXTREMITY: Status: ACTIVE | Noted: 2023-09-29

## 2023-09-29 PROBLEM — M51.369 ANNULAR TEAR OF LUMBAR DISC: Status: ACTIVE | Noted: 2023-09-29

## 2023-09-29 PROBLEM — M48.061 LUMBAR STENOSIS: Status: ACTIVE | Noted: 2023-09-29

## 2023-09-29 PROBLEM — M51.369 DEGENERATION OF INTERVERTEBRAL DISC OF LUMBAR REGION: Status: ACTIVE | Noted: 2023-09-29

## 2023-09-29 PROBLEM — R06.02 SHORTNESS OF BREATH: Status: ACTIVE | Noted: 2023-09-29

## 2023-09-29 RX ORDER — ERGOCALCIFEROL 1.25 MG/1
CAPSULE ORAL
COMMUNITY
End: 2024-01-03 | Stop reason: ALTCHOICE

## 2023-09-29 RX ORDER — LISINOPRIL 40 MG/1
40 TABLET ORAL DAILY
COMMUNITY
Start: 2019-02-18 | End: 2024-01-03 | Stop reason: ALTCHOICE

## 2023-09-29 RX ORDER — CHLORTHALIDONE 25 MG/1
25 TABLET ORAL
COMMUNITY
Start: 2021-03-04 | End: 2023-11-02

## 2023-09-29 RX ORDER — HYDROCORTISONE AND ACETIC ACID 20.75; 10.375 MG/ML; MG/ML
3 SOLUTION AURICULAR (OTIC) 3 TIMES DAILY PRN
COMMUNITY
Start: 2020-09-03

## 2023-09-29 RX ORDER — GABAPENTIN 300 MG/1
900 CAPSULE ORAL 3 TIMES DAILY
COMMUNITY
Start: 2016-03-15 | End: 2023-10-05 | Stop reason: SDUPTHER

## 2023-09-29 RX ORDER — HYDROCHLOROTHIAZIDE 25 MG/1
25 TABLET ORAL
COMMUNITY
Start: 2011-10-10 | End: 2024-01-03 | Stop reason: ALTCHOICE

## 2023-09-29 RX ORDER — ESCITALOPRAM OXALATE 10 MG/1
10 TABLET ORAL DAILY
COMMUNITY
Start: 2017-11-02 | End: 2024-04-29

## 2023-09-29 RX ORDER — ATORVASTATIN CALCIUM 20 MG/1
20 TABLET, FILM COATED ORAL DAILY
COMMUNITY
Start: 2019-02-18 | End: 2024-01-03 | Stop reason: ALTCHOICE

## 2023-09-29 RX ORDER — LISINOPRIL 40 MG/1
1 TABLET ORAL DAILY
COMMUNITY
End: 2024-01-03 | Stop reason: ALTCHOICE

## 2023-09-29 RX ORDER — GABAPENTIN 300 MG/1
600 CAPSULE ORAL
COMMUNITY
Start: 2011-10-10 | End: 2023-10-05 | Stop reason: SDUPTHER

## 2023-09-29 RX ORDER — LEVOTHYROXINE SODIUM 100 UG/1
100 TABLET ORAL DAILY
COMMUNITY
Start: 2019-02-18

## 2023-09-29 RX ORDER — OXYCODONE HYDROCHLORIDE 5 MG/1
TABLET ORAL
COMMUNITY
Start: 2023-06-21 | End: 2023-10-05 | Stop reason: SDUPTHER

## 2023-09-29 RX ORDER — LISINOPRIL 10 MG/1
10 TABLET ORAL
COMMUNITY
Start: 2011-10-10

## 2023-09-29 RX ORDER — ATORVASTATIN CALCIUM 10 MG/1
10 TABLET, FILM COATED ORAL
COMMUNITY
Start: 2011-10-10 | End: 2024-01-03 | Stop reason: ALTCHOICE

## 2023-09-29 RX ORDER — FUROSEMIDE 20 MG/1
20 TABLET ORAL
COMMUNITY
Start: 2022-06-15 | End: 2024-01-03 | Stop reason: ALTCHOICE

## 2023-09-29 RX ORDER — ALBUTEROL SULFATE 90 UG/1
2 AEROSOL, METERED RESPIRATORY (INHALATION) 2 TIMES DAILY PRN
COMMUNITY
Start: 2022-07-18 | End: 2023-11-07 | Stop reason: SDUPTHER

## 2023-09-30 DIAGNOSIS — E78.2 HYPERLIPIDEMIA, MIXED: Primary | ICD-10-CM

## 2023-10-02 ENCOUNTER — APPOINTMENT (OUTPATIENT)
Dept: PAIN MEDICINE | Facility: CLINIC | Age: 73
End: 2023-10-02
Payer: COMMERCIAL

## 2023-10-02 RX ORDER — ATORVASTATIN CALCIUM 40 MG/1
40 TABLET, FILM COATED ORAL DAILY
Qty: 90 TABLET | Refills: 2 | Status: SHIPPED | OUTPATIENT
Start: 2023-10-02

## 2023-10-05 ENCOUNTER — OFFICE VISIT (OUTPATIENT)
Dept: PAIN MEDICINE | Facility: CLINIC | Age: 73
End: 2023-10-05
Payer: COMMERCIAL

## 2023-10-05 DIAGNOSIS — M54.32 BILATERAL SCIATICA: ICD-10-CM

## 2023-10-05 DIAGNOSIS — Z79.891 LONG-TERM CURRENT USE OF OPIATE ANALGESIC: ICD-10-CM

## 2023-10-05 DIAGNOSIS — M54.31 BILATERAL SCIATICA: ICD-10-CM

## 2023-10-05 DIAGNOSIS — M48.061 SPINAL STENOSIS OF LUMBAR REGION WITHOUT NEUROGENIC CLAUDICATION: ICD-10-CM

## 2023-10-05 DIAGNOSIS — M47.816 LUMBAR SPONDYLOSIS: ICD-10-CM

## 2023-10-05 DIAGNOSIS — M51.36 ANNULAR TEAR OF LUMBAR DISC: Primary | ICD-10-CM

## 2023-10-05 DIAGNOSIS — M50.30 DEGENERATION OF INTERVERTEBRAL DISC OF CERVICAL REGION: ICD-10-CM

## 2023-10-05 DIAGNOSIS — M51.36 DEGENERATION OF INTERVERTEBRAL DISC OF LUMBAR REGION: ICD-10-CM

## 2023-10-05 DIAGNOSIS — M51.26 LUMBAR HERNIATED DISC: ICD-10-CM

## 2023-10-05 PROCEDURE — 1160F RVW MEDS BY RX/DR IN RCRD: CPT | Performed by: PHYSICAL MEDICINE & REHABILITATION

## 2023-10-05 PROCEDURE — 1159F MED LIST DOCD IN RCRD: CPT | Performed by: PHYSICAL MEDICINE & REHABILITATION

## 2023-10-05 PROCEDURE — 99214 OFFICE O/P EST MOD 30 MIN: CPT | Performed by: PHYSICAL MEDICINE & REHABILITATION

## 2023-10-05 PROCEDURE — 4004F PT TOBACCO SCREEN RCVD TLK: CPT | Performed by: PHYSICAL MEDICINE & REHABILITATION

## 2023-10-05 RX ORDER — OXYCODONE AND ACETAMINOPHEN 10; 325 MG/1; MG/1
1 TABLET ORAL EVERY 6 HOURS PRN
Qty: 112 TABLET | Refills: 0 | Status: SHIPPED | OUTPATIENT
Start: 2023-11-02 | End: 2023-11-27 | Stop reason: SDUPTHER

## 2023-10-05 RX ORDER — OXYCODONE AND ACETAMINOPHEN 10; 325 MG/1; MG/1
1 TABLET ORAL EVERY 6 HOURS PRN
Qty: 112 TABLET | Refills: 0 | Status: SHIPPED | OUTPATIENT
Start: 2023-10-05 | End: 2023-11-02

## 2023-10-05 RX ORDER — GABAPENTIN 300 MG/1
300 CAPSULE ORAL 3 TIMES DAILY
Qty: 90 CAPSULE | Refills: 1 | Status: SHIPPED | OUTPATIENT
Start: 2023-10-05 | End: 2023-11-27 | Stop reason: SDUPTHER

## 2023-10-08 NOTE — PROGRESS NOTES
Ms Weston returned today for follow up office visit.  Pain is controlled but is not gone   The lower back pain is mechanical in characteristics , continuous and gets worse with movements mainly with forward flexion and bending. The pain is radiating to the right lower limb on the lateral leg. that is burning and tingling on a continuous fashion. the pain goes in aggravation intermittently with sharp lancinating, electrical like jolts and sensations. These are felt on the skin and deeper in the tissues.      She is on schedule with the pain medications and using those as prescribed for the pain control. Denied euphoria associated with the use of the pain medications.   the pain is rated at 9/10 without the medications. But, with the pain medications the pain level drops to 8 /10 because of the recent aggravation of the symptoms      opioids treatment agreement reviewed on January 2023   Oarrs pulled and scanned in the chart ORS is 220 . she is on gabapentin from Primary care physician discussed with her about me taking over that. she realizes the interaction between the therapeutic classes including the respiratory depression and potential death   last urine toxicology testing renewed January 2023 and it is compatible with the prescription medications but june test is as above oxycodone and hydrocodone  Xray updated lumbar spine and electrodiagnostic study of the right upper limb  ORT Score is 0  Pain pathology and pain generators lumbar spine intervertebral disc and lumbar radiculopathy  Modalities tried injection and physical therapy and nonsteroidal anti-inflammatory medications     The control of the pain with the pain medications is helping the control of the symptoms and allowing the function and activities of daily living, enjoyment of life, quality of life and sleep with less interruption by the pain. The goal is symptomatic control of the nonmalignant chronic pain and not to repair the permanent damage in  the tissues inducing the chronic pain conditions. We are aiming to shift the focus from the nonmalignant chronic pain to other aspects of life by symptomatically treating this chronic pain.     ROS  Denied any fever or chills. No weight loss and no night sweats. No cough or sputum production. No diarrhea   The constipation has been responding to fibers and over the counter medications.     No bladder and bowel incontinence and no other changes in bladder and bowel. No skin changes.  Reports tiredness and fatigability only if the pain is not controlled.   Denied opioids diversion and abuse and denies alcoholism. Denies overuse of the pain medications.    Exam  Awake, alert and oriented x 3 Pupils are equal, midsize, reactive to light and accommodations.     The speech is coherent well articulated and understood.   breathing with normal rate and rhythm declined Chaperone for the visit and was appropriately draped for the exam.     Straight leg raising increased the pain in the back with radiation to the lower limbs up to 60° decreased sensory to light touch on the lateral legs bilateral      Ledy negative for axial loading and log rotation no cane no assistive devices      she is having spasms in the lumbar spine      skin in intact in the feet. cap refill of 2 second and palpable pedal pulses   nares are clear . no iv tracks in upper limbs or ankle      IMPRESSION  Problem List Items Addressed This Visit       Lumbar herniated disc    Relevant Medications    gabapentin (Neurontin) 300 mg capsule    Degeneration of intervertebral disc of cervical region    Relevant Medications    oxyCODONE-acetaminophen (Percocet)  mg tablet    oxyCODONE-acetaminophen (Percocet)  mg tablet (Start on 11/2/2023)    Degeneration of intervertebral disc of lumbar region    Relevant Medications    gabapentin (Neurontin) 300 mg capsule    Sciatica    Relevant Medications    gabapentin (Neurontin) 300 mg capsule    Lumbar  spondylosis    Lumbar stenosis    Annular tear of lumbar disc - Primary    Relevant Medications    oxyCODONE-acetaminophen (Percocet)  mg tablet    oxyCODONE-acetaminophen (Percocet)  mg tablet (Start on 11/2/2023)    Long-term current use of opiate analgesic     The pain medications are controlling the symptoms and allowing the improvement of the activities of daily living and quality of life and quality of sleep   Other modalities were tried and failed. We are using the pain medications as a 4th line of treatment after Physical Therapy surgery injection and non opioids pain medications .   Compliance with the treatment plan is monitored closely using random urine drug testing 2-3 times a year and checking on the prescription monitoring drug program on a regular basis at every office visit before any prescription.  We also do random pill counts upon as indicated upon any suspicion regarding overuse or misuse of pain medications.  The pain control is enabling performance of the home exercises program and that is also helping with the pain control            Based on the above findings and the clinical response to the opioids medications and improvement of the activities of daily living, sleep, and work performance. We made this complex decision to continue the opioids therapy in light of the evidence of the patient's responsibility in using the pain medications as prescribed for the nonmalignant chronic pain condition. We discussed about the use of the pain medications to treat the symptoms of chronic nonmalignant pain and we are not trying the repair the permanent damage in the tissues, rather we are trying to control the symptoms induced by the permanent damage to the tissues inducing the chronic pain condition and resulting disability. I explained the difference and discussed it with the patient and stressed the importance of knowing the difference especially because of the potential side effects and  the potential addicting effect and habit forming nature of the dangerous drugs we are using to treat the symptoms of the chronic pain.     We discussed that we are prescribing the medications on good laura and legitimate medical reason     The level of clinical decision making in this office visit,  is high, given the high risks of complications with the morbidity and mortality due to the fact that acute and chronic pain may pose a threat to life and bodily function, if under treated, poorly treated, or with failure to maintain adequate treatment and timely medical follow up. Additionally over treatment has its own set of complications including overdosing on the pain medications and also the habit forming potentials with the use of the medications used to treat chronic painful conditions including therapeutic classes classified as dangerous medications. Given the serious and fluctuating nature of pain level and instensity with extensive consideration for whenever pain changes, there is always the risk of prolonged functional impairment requiring close patient monitoring with regular assessments and reassessments and high level medical decision making at every office visit. The amount and complexity of data reviewed is high given the patient clinical presentation, labs,  data, radiology reports, and other tests as discussed during office visits. Pertinent data whether positive or negative were taken in consideration in the process of making this high level medical decision.

## 2023-10-08 NOTE — ASSESSMENT & PLAN NOTE
The pain medications are controlling the symptoms and allowing the improvement of the activities of daily living and quality of life and quality of sleep   Other modalities were tried and failed. We are using the pain medications as a 4th line of treatment after Physical Therapy surgery injection and non opioids pain medications .   Compliance with the treatment plan is monitored closely using random urine drug testing 2-3 times a year and checking on the prescription monitoring drug program on a regular basis at every office visit before any prescription.  We also do random pill counts upon as indicated upon any suspicion regarding overuse or misuse of pain medications.  The pain control is enabling performance of the home exercises program and that is also helping with the pain control

## 2023-10-27 DIAGNOSIS — I10 BENIGN ESSENTIAL HYPERTENSION: ICD-10-CM

## 2023-11-02 RX ORDER — CHLORTHALIDONE 25 MG/1
25 TABLET ORAL DAILY
Qty: 90 TABLET | Refills: 2 | Status: SHIPPED | OUTPATIENT
Start: 2023-11-02

## 2023-11-07 ENCOUNTER — TELEPHONE (OUTPATIENT)
Dept: PRIMARY CARE | Facility: CLINIC | Age: 73
End: 2023-11-07
Payer: COMMERCIAL

## 2023-11-07 DIAGNOSIS — J43.2 CENTRILOBULAR EMPHYSEMA (MULTI): ICD-10-CM

## 2023-11-07 DIAGNOSIS — R06.02 SOB (SHORTNESS OF BREATH): ICD-10-CM

## 2023-11-07 RX ORDER — ALBUTEROL SULFATE 90 UG/1
2 AEROSOL, METERED RESPIRATORY (INHALATION) 2 TIMES DAILY
Qty: 18 G | Refills: 2 | Status: SHIPPED | OUTPATIENT
Start: 2023-11-07 | End: 2024-01-03 | Stop reason: SDUPTHER

## 2023-11-27 ENCOUNTER — OFFICE VISIT (OUTPATIENT)
Dept: PAIN MEDICINE | Facility: CLINIC | Age: 73
End: 2023-11-27
Payer: COMMERCIAL

## 2023-11-27 DIAGNOSIS — M51.36 DEGENERATION OF INTERVERTEBRAL DISC OF LUMBAR REGION: ICD-10-CM

## 2023-11-27 DIAGNOSIS — M54.31 BILATERAL SCIATICA: ICD-10-CM

## 2023-11-27 DIAGNOSIS — M50.30 DEGENERATION OF INTERVERTEBRAL DISC OF CERVICAL REGION: ICD-10-CM

## 2023-11-27 DIAGNOSIS — M54.32 BILATERAL SCIATICA: ICD-10-CM

## 2023-11-27 DIAGNOSIS — M51.36 ANNULAR TEAR OF LUMBAR DISC: Primary | ICD-10-CM

## 2023-11-27 DIAGNOSIS — M51.26 LUMBAR HERNIATED DISC: ICD-10-CM

## 2023-11-27 PROCEDURE — 99214 OFFICE O/P EST MOD 30 MIN: CPT | Performed by: PHYSICAL MEDICINE & REHABILITATION

## 2023-11-27 PROCEDURE — 4004F PT TOBACCO SCREEN RCVD TLK: CPT | Performed by: PHYSICAL MEDICINE & REHABILITATION

## 2023-11-27 PROCEDURE — 1160F RVW MEDS BY RX/DR IN RCRD: CPT | Performed by: PHYSICAL MEDICINE & REHABILITATION

## 2023-11-27 PROCEDURE — 1159F MED LIST DOCD IN RCRD: CPT | Performed by: PHYSICAL MEDICINE & REHABILITATION

## 2023-11-27 RX ORDER — OXYCODONE AND ACETAMINOPHEN 10; 325 MG/1; MG/1
1 TABLET ORAL EVERY 6 HOURS PRN
Qty: 112 TABLET | Refills: 0 | Status: SHIPPED | OUTPATIENT
Start: 2023-12-28 | End: 2024-01-22 | Stop reason: SDUPTHER

## 2023-11-27 RX ORDER — OXYCODONE AND ACETAMINOPHEN 10; 325 MG/1; MG/1
1 TABLET ORAL EVERY 6 HOURS PRN
Qty: 112 TABLET | Refills: 0 | Status: SHIPPED | OUTPATIENT
Start: 2023-11-30 | End: 2023-12-28

## 2023-11-27 RX ORDER — GABAPENTIN 300 MG/1
300 CAPSULE ORAL 3 TIMES DAILY
Qty: 90 CAPSULE | Refills: 1 | Status: SHIPPED | OUTPATIENT
Start: 2023-11-27 | End: 2024-01-30 | Stop reason: SDUPTHER

## 2023-11-27 NOTE — PROGRESS NOTES
Chief complaint  Neck pain and back and left leg pain     History  Ms Weston is back for visit  She has the pain neck and pain in the back and left leg  The neck pain is on the left that is limiting the ROM. The pain in the back is deep on the left side.  The pain is above the belt line, it is continuous but it gets worse with extension of the lumbar spine.  It improves with leaning forward.  Extension combined with rotation to the left side worsens the pain.  There are no reported radiation of the pain to the lower limbs.     No bowel or bladder incontinence.  No sensory or motor changes in the lower limbs.    No changes or in the color or textures of the skin of the lower limbs.     Also pain in the left leg from back The pain in the back is deep achy worse in the mid back area.  This is associated with tight muscle bands.  This limits the range of motion of the lumbar spine mainly in forward flexion.  The pain in the back is radiating around the side on the lateral aspect of the left thigh going down toward the lateral aspect of the left leg into the lateral malleolus toward the lateral aspect of the foot towards the left big toe.    This pain down to the left lower limb is more of a burning tingling sensation.  The pain in the left lower limb worsens with bending forward or with any lifting, it improves with laying on the side and resting.  This is similar to the associated pain in the middle to lower back.  Denied any bowel or bladder incontinence.  With the worst pain there is tendency to catch the toe especially on carpeted area.  This occurs mostly when tired and toward the end of the day.    The skin is intact with no breakdown.  No vesicles.        Pain level without medication is 7/10 , with the medication pain level 1/10.     The pain meds are helping control the pain and improving Activities of Daily living and quality of life and quality of sleep.    opioids treatment agreement 2023  Oarrs pulled and  scanned in the chart  no concerns  last urine toxicology testing earlier this year and it was compliant we will repeat  Xray updated spine   ORT Score is  0  Pain pathology and pain generators spine 2  Modalities tried injection, surgery, physical therapy, TENS unit, nonsteroidal anti-inflammatory medication       Denied any fever or chills. No weight loss and no night sweats. No cough or sputum production. No diarrhea   The constipation has been responding to fibers and over the counter medications.     No bladder and bowel incontinence and no other changes in bladder and bowel. No skin changes.  Reports tiredness and fatigability only if the pain is not controlled.   Denied opioids diversion and abuse and denies alcoholism. Denies overuse of the pain medications.    The control of the pain with the pain medications is helping the control of the symptoms and allowing the function and activities of daily living, enjoyment of life, improving the quality of life and sleep with less interruption by the pain. The goal is symptomatic control of the nonmalignant chronic pain and not to repair the permanent damage in the tissues inducing the chronic pain conditions. We are aiming to shift the focus from the nonmalignant chronic pain to other aspects of life by symptomatically treating this chronic pain. If this pain is not treated it will lead to major morbidity and it is also associated with increased risks of mortality. The patient understands those very clearly and also understand high risks of morbidity and mortality if not strictly adherent to the treatment recommendations and reporting any associated side effects. Also patient understand the full responsibility associated with these medications to avoid abuse or overuse or any use of these medications for anything besides treating the patient's own chronic pain and nothing else under any circumstances.        Physical examination  Awake, alert and oriented for time  place and persons   declined Chaperone for the visit and was adequately  draped for the exam.    Examination of the lumbar spine showed tight muscle bands in the mid and lower back area, this is more pronounced on the left compared to the right.  Additionally, this is inducing mild reversal of the lumbar lordosis with functional scoliosis with left-sided concavity.  This scoliosis corrects with  bending of the lumbar spine.     Straight leg raising increased the pain in the back and down the lateral aspect of the left knee onto the lateral leg to the left lateral malleolus and foot.     There is decreased sensory to light touch on the lateral aspect of the thigh and around the left knee going down to the lateral aspect of the leg to the left lateral malleolus into the dorsum of the left foot.   Deep tendon reflexes is present for the patellar tendons bilaterally.  Achilles reflexes are present bilaterally and symmetric.    Medial Hamstrings reflex is decreased on the left compared to the right side.  Plantar cutaneous are down going bilaterally.  Ankle extension are  5/5 bilaterally. Plantar flexion of the ankles are 5/5 bilaterally.  Big toe extension is 4/5 on the left compared to 5/5 on the right side.    Negative Tinel's sign over the left peroneal nerve at the fibular neck.  Ledy sign for axial loading and global rotation are negative.  No aberrant pain behavior.         Diagnosis  Problem List Items Addressed This Visit    None       Plan  Reviewed the pain generators.  Went over the types of pain with neuropathic and nociceptive and different pathologies and therapeutic modalities. Discussed the mechanism of action of interventions from acupuncture, physical therapy , regular exercises, injections, botox, spinal cord stimulation, and role of surgery     Went over pathology of the intervertebral disc displacement and the anatomical relation to the Nerve roots and relation to the radicular symptoms. Went  over treatment modalities with conservative treatment including acupuncture   and epidural steroid injection with fluoroscopy guidance and last resort of surgery    Based on the above findings and the clinical response to the opioids medications and improvement of the activities of daily living, sleep, and work performance. We made this complex decision to continue the opioids therapy in light of the evidence of the patient's responsibility in using the pain medications as prescribed for the nonmalignant chronic pain condition. We discussed about the use of the pain medications to treat the symptoms of chronic nonmalignant pain and we are not trying the repair the permanent damage in the tissues, rather we are trying to control the symptoms induced by the permanent damage to the tissues inducing the chronic pain condition and resulting disability. I explained the difference and discussed it with the patient and stressed the importance of knowing the difference especially because of the potential side effects and the potential addicting effect and habit forming nature of the dangerous drugs we are using to treat the symptoms of the chronic pain.      We discussed that we are prescribing the medications on good laura and legitimate medical reason.     We reviewed the side effects and precautions of opioids prescriptions as discussed in the opioids treatment agreement.    realizes the interaction between the therapeutic classes including the respiratory depression and potential death     Random drug testing twice in 6 months we will submit     Oxycodone 10 max of 4 a day. Gabapentin for neuropathic pain 300 TID     She gets pain meds for baseline pain and with aggrvation she gets STEVE     Discussed about NSAIDS and I explained about the opioids sparing effect to allow keeping the opioids dose at minimal effective dose.   I went over the potential side effects of the NSAIDS on the gastrointestinal, renal and  cardiovascular systems.      I detailed the side effects from the acetaminophen in the medication and made aware of those. I also explained about the cumulative effects on the organs and mainly the liver.     Given the opioids therapy , we discussed about the risk for accidental over dose on the pain medications, either for patient or other household. I went over the mechanism of action and mode of use of the Naloxone according to the  recommendations. I will provide a prescription for a kit.     Follow-up 8 weeks or earlier if needed     The level of clinical decision making in this office visit,  is high, given the high risks of complications with the morbidity and mortality due to the fact that acute and chronic pain may pose a threat to life and bodily function, if under treated, poorly treated, or with failure to maintain adequate treatment and timely medical follow up. Additionally over treatment has its own set of complications including overdosing on the pain medications and also the habit forming potentials with the use of the medications used to treat chronic painful conditions including therapeutic classes classified as dangerous medications. Given the serious and fluctuating nature of pain level and instensity with extensive consideration for whenever pain changes, there is always the risk of prolonged functional impairment requiring close patient monitoring with regular assessments and reassessments and high level medical decision making at every office visit. The amount and complexity of data reviewed is high given the patient clinical presentation, labs,  data, radiology reports, and other tests as discussed during office visits. Pertinent data whether positive or negative were taken in consideration in the process of making this high level medical decision.

## 2023-12-18 DIAGNOSIS — I10 BENIGN ESSENTIAL HYPERTENSION: ICD-10-CM

## 2023-12-20 RX ORDER — METOPROLOL SUCCINATE 25 MG/1
25 TABLET, EXTENDED RELEASE ORAL DAILY
Qty: 90 TABLET | Refills: 1 | Status: SHIPPED | OUTPATIENT
Start: 2023-12-20

## 2023-12-20 RX ORDER — PANTOPRAZOLE SODIUM 40 MG/1
40 TABLET, DELAYED RELEASE ORAL DAILY
Qty: 90 TABLET | Refills: 1 | Status: SHIPPED | OUTPATIENT
Start: 2023-12-20

## 2023-12-20 RX ORDER — LISINOPRIL 40 MG/1
40 TABLET ORAL DAILY
Qty: 90 TABLET | Refills: 1 | Status: SHIPPED | OUTPATIENT
Start: 2023-12-20 | End: 2024-01-03 | Stop reason: ALTCHOICE

## 2024-01-03 ENCOUNTER — OFFICE VISIT (OUTPATIENT)
Dept: PRIMARY CARE | Facility: CLINIC | Age: 74
End: 2024-01-03
Payer: COMMERCIAL

## 2024-01-03 VITALS
HEART RATE: 65 BPM | BODY MASS INDEX: 18.34 KG/M2 | SYSTOLIC BLOOD PRESSURE: 128 MMHG | DIASTOLIC BLOOD PRESSURE: 90 MMHG | TEMPERATURE: 97.1 F | OXYGEN SATURATION: 91 % | HEIGHT: 68 IN | WEIGHT: 121 LBS

## 2024-01-03 DIAGNOSIS — I48.20 CHRONIC ATRIAL FIBRILLATION (MULTI): ICD-10-CM

## 2024-01-03 DIAGNOSIS — R06.02 SOB (SHORTNESS OF BREATH): ICD-10-CM

## 2024-01-03 DIAGNOSIS — J43.2 CENTRILOBULAR EMPHYSEMA (MULTI): ICD-10-CM

## 2024-01-03 DIAGNOSIS — I50.32 CHRONIC DIASTOLIC CONGESTIVE HEART FAILURE (MULTI): ICD-10-CM

## 2024-01-03 PROBLEM — M46.1 INFLAMMATION OF SACROILIAC JOINT (CMS-HCC): Status: RESOLVED | Noted: 2023-09-29 | Resolved: 2024-01-03

## 2024-01-03 PROCEDURE — 1159F MED LIST DOCD IN RCRD: CPT | Performed by: FAMILY MEDICINE

## 2024-01-03 PROCEDURE — 99214 OFFICE O/P EST MOD 30 MIN: CPT | Performed by: FAMILY MEDICINE

## 2024-01-03 PROCEDURE — 3080F DIAST BP >= 90 MM HG: CPT | Performed by: FAMILY MEDICINE

## 2024-01-03 PROCEDURE — 1160F RVW MEDS BY RX/DR IN RCRD: CPT | Performed by: FAMILY MEDICINE

## 2024-01-03 PROCEDURE — 3074F SYST BP LT 130 MM HG: CPT | Performed by: FAMILY MEDICINE

## 2024-01-03 RX ORDER — ALBUTEROL SULFATE 90 UG/1
2 AEROSOL, METERED RESPIRATORY (INHALATION) 2 TIMES DAILY
Qty: 18 G | Refills: 2 | Status: SHIPPED | OUTPATIENT
Start: 2024-01-03

## 2024-01-03 NOTE — PROGRESS NOTES
Subjective   Patient ID: Abi Weston is a 73 y.o. female who presents for Follow-up.    Assessment/Plan     Problem List Items Addressed This Visit       Chronic atrial fibrillation (CMS/HCC)    Relevant Orders    CBC    Basic Metabolic Panel    TSH with reflex to Free T4 if abnormal    Diastolic CHF (CMS/HCC)    Relevant Orders    CBC    Basic Metabolic Panel    TSH with reflex to Free T4 if abnormal    Chronic obstructive pulmonary disease (CMS/HCC)    Relevant Medications    albuterol 90 mcg/actuation inhaler     Other Visit Diagnoses       SOB (shortness of breath)        Relevant Medications    albuterol 90 mcg/actuation inhaler        Continue current medication  Hold Lasix  Follow-up with cardiology  Advised to stay away from smoking  Continue atorvastatin 40 mg  Follow-up in 4 months  Mammogram April 2022 within normal limits  Scan showed osteopenia in April 2022  Declined flu vaccine      Up-to-date with pneumonia vaccine  New shingles vaccine patient declined for now  Colonoscopy January 2019 and repeat in 5 years hyperplastic polyps  Follow-up in 6 months  Come back early with any new sign and symptoms patient understands and agreement plan.     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 Rossi  Status post lumbar spinal fusion and decompression - was seen by neurosurgery Dr. Ramos - history of spinal surgery Ã--2 -  following up with pain management  Chronic pain syndrome - lower back pain - following up with pain management  Osteoarthritis  Status post right carpal tunnel surgery feeling better  GERD  Diastolic CHF     Started smoking again advised patient to stop smoking  Following up with cardiology and pain management  Still smoking     Left ear itching  Complaining of weight loss we will consider low-dose of Remeron 7.5 mg risk-benefit discussed  Advance directives:. Advanced Care Planning discussed and documented advance care plan or surrogate decision maker documented in the medical record.   A Conversation about Advance directives of at least 16 minutes has occurred. Actual time spent: I spent >16 minutes discussing Advance Care Planning including the explanation and discussion of advance directives. If patient does not have current up to date documents, examples and information provided on how to create both Living Will and Power of . Patient was encouraged to work on completing these documents.  Conversation with: The conversation with the patient and/or participants was voluntary.  Documents discussed and/or completed: Living Will was discussed with participants. Healthcare POA was discussed with participants. Patient educated on how to obtain Living Will and Power of . Discussed patient end of life choices.   Patient has living will and patient's daughter liudmila is healthcare power of .  Patient is full code.      Allergies   Allergen Reactions    Cefazolin Hives     Burn hives    red blotches       Current Outpatient Medications   Medication Sig Dispense Refill    apixaban (Eliquis) 5 mg tablet Take 1 tablet (5 mg) by mouth 2 times a day. 180 tablet 2    atorvastatin (Lipitor) 40 mg tablet TAKE 1 TABLET DAILY 90 tablet 2    biotin 10 mg tablet Take by mouth.      chlorthalidone (Hygroton) 25 mg tablet TAKE 1 TABLET DAILY 90 tablet 2    cholecalciferol (Vitamin D-3) 50 mcg (2,000 unit)  "capsule Take by mouth.      escitalopram (Lexapro) 10 mg tablet Take 1 tablet (10 mg) by mouth once daily.      metoprolol succinate XL (Toprol-XL) 25 mg 24 hr tablet TAKE 1 TABLET DAILY 90 tablet 1    multivitamin with minerals (HAIR,SKIN AND NAILS ORAL) Hair skin and nails supplement      oxyCODONE-acetaminophen (Percocet)  mg tablet Take 1 tablet by mouth every 6 hours if needed for severe pain (7 - 10) for up to 28 days. Do not start before December 28, 2023. 112 tablet 0    pantoprazole (ProtoNix) 40 mg EC tablet TAKE 1 TABLET DAILY 90 tablet 1    albuterol 90 mcg/actuation inhaler Inhale 2 puffs 2 times a day. 18 g 2    gabapentin (Neurontin) 300 mg capsule Take 1 capsule (300 mg) by mouth 3 times a day. 90 capsule 1    hydrocortisone-acetic acid (Vosol-HC) otic solution Administer 3 drops into each ear 3 times a day as needed (itching).      levothyroxine (Synthroid, Levoxyl) 100 mcg tablet Take 1 tablet (100 mcg) by mouth once daily.      levothyroxine (Synthroid, Levoxyl) 75 mcg tablet TAKE 1 TABLET DAILY AS     DIRECTED 90 tablet 2    lisinopril 10 mg tablet Take 1 tablet (10 mg) by mouth once daily.      mirtazapine (Remeron) 7.5 mg tablet Take 1 tablet (7.5 mg) by mouth once daily at bedtime. 30 tablet 11    naloxone (Narcan) 4 mg/0.1 mL nasal spray Administer into affected nostril(s).       No current facility-administered medications for this visit.       Objective   Visit Vitals  /90 (BP Location: Left arm, Patient Position: Sitting)   Pulse 65   Temp 36.2 °C (97.1 °F)   Ht 1.727 m (5' 8\")   Wt 54.9 kg (121 lb)   SpO2 91%   BMI 18.40 kg/m²   Smoking Status Every Day   BSA 1.62 m²     Physical Exam  Constitutional:       General: He is not in acute distress.     Appearance: Normal appearance.   HENT:      Head: Normocephalic and atraumatic.      Nose: Nose normal.   Eyes:      Extraocular Movements: Extraocular movements intact.      Conjunctiva/sclera: Conjunctivae normal. "   Cardiovascular:      Rate and Rhythm: Normal rate and regular rhythm.   Pulmonary:      Effort: Pulmonary effort is normal.      Breath sounds: Normal breath sounds.   Skin:     General: Skin is warm.   Neurological:      Mental Status: He is alert and oriented to person, place, and time.   Psychiatric:         Mood and Affect: Mood normal.         Behavior: Behavior normal.   Immunization History   Administered Date(s) Administered    Moderna COVID-19 vaccine, bivalent, blue cap/gray label *Check age/dose* 12/20/2022    Moderna SARS-CoV-2 Vaccination 03/03/2021, 03/31/2021    Pneumococcal conjugate vaccine, 13-valent (PREVNAR 13) 09/04/2019    Pneumococcal polysaccharide vaccine, 23-valent, age 2 years and older (PNEUMOVAX 23) 08/29/2014, 02/16/2021    Td vaccine, age 7 years and older (TDVAX) 10/26/2011    Td vaccine, age 7 years and older (TENIVAC) 03/11/2016       Review of Systems    No visits with results within 4 Month(s) from this visit.   Latest known visit with results is:   Legacy Encounter on 01/19/2023   Component Date Value Ref Range Status    DRUG SCREEN COMMENT URINE 01/19/2023 SEE BELOW   Final    Creatine, Urine 01/19/2023 125.5  mg/dL Final    Amphetamine Screen, Urine 01/19/2023 PRESUMPTIVE NEGATIVE  NEGATIVE Final    Barbiturate Screen, Urine 01/19/2023 PRESUMPTIVE NEGATIVE  NEGATIVE Final    Cannabinoid Screen, Urine 01/19/2023 PRESUMPTIVE NEGATIVE  NEGATIVE Final    Cocaine Screen, Urine 01/19/2023 PRESUMPTIVE NEGATIVE  NEGATIVE Final    PCP Screen, Urine 01/19/2023 PRESUMPTIVE NEGATIVE  NEGATIVE Final    7-Aminoclonazepam 01/19/2023 <25  Cutoff <25 ng/mL Final    Alpha-Hydroxyalprazolam 01/19/2023 <25  Cutoff <25 ng/mL Final    Alpha-Hydroxymidazolam 01/19/2023 <25  Cutoff <25 ng/mL Final    Alprazolam 01/19/2023 <25  Cutoff <25 ng/mL Final    Chlordiazepoxide 01/19/2023 <25  Cutoff <25 ng/mL Final    Clonazepam 01/19/2023 <25  Cutoff <25 ng/mL Final    Diazepam 01/19/2023 <25  Cutoff <25  ng/mL Final    Lorazepam 01/19/2023 <25  Cutoff <25 ng/mL Final    Midazolam 01/19/2023 <25  Cutoff <25 ng/mL Final    Nordiazepam 01/19/2023 <25  Cutoff <25 ng/mL Final    Oxazepam 01/19/2023 <25  Cutoff <25 ng/mL Final    Temazepam 01/19/2023 <25  Cutoff <25 ng/mL Final    Zolpidem 01/19/2023 <25  Cutoff <25 ng/mL Final    Zolpidem Metabolite (ZCA) 01/19/2023 <25  Cutoff <25 ng/mL Final    6-Acetylmorphine 01/19/2023 <25  Cutoff <25 ng/mL Final    Codeine 01/19/2023 <50  Cutoff <50 ng/mL Final    Hydrocodone 01/19/2023 <25  Cutoff <25 ng/mL Final    Hydromorphone 01/19/2023 <25  Cutoff <25 ng/mL Final    Morphine Urine 01/19/2023 <50  Cutoff <50 ng/mL Final    Norhydrocodone 01/19/2023 <25  Cutoff <25 ng/mL Final    Noroxycodone 01/19/2023 >1000 (A)  Cutoff <25 ng/mL Final    Oxycodone 01/19/2023 1,262 (A)  Cutoff <25 ng/mL Final    Oxymorphone 01/19/2023 31 (A)  Cutoff <25 ng/mL Final    Tramadol 01/19/2023 <50  Cutoff <50 ng/mL Final    O-Desmethyltramadol 01/19/2023 <50  Cutoff <50 ng/mL Final    Fentanyl 01/19/2023 <2.5  Cutoff<2.5 ng/mL Final    Norfentanyl 01/19/2023 <2.5  Cutoff<2.5 ng/mL Final    METHADONE CONFIRMATION,URINE 01/19/2023 <25  Cutoff <25 ng/mL Final    EDDP 01/19/2023 <25  Cutoff <25 ng/mL Final       Radiology: Reviewed imaging in powerchart.  No results found.    Family History   Problem Relation Name Age of Onset    Diabetes Mother      Hypertension Mother      Stroke Father       Social History     Socioeconomic History    Marital status:      Spouse name: None    Number of children: None    Years of education: None    Highest education level: None   Occupational History    None   Tobacco Use    Smoking status: Every Day     Packs/day: 0.50     Years: 30.00     Additional pack years: 0.00     Total pack years: 15.00     Types: Cigarettes    Smokeless tobacco: Never   Substance and Sexual Activity    Alcohol use: Never    Drug use: Yes     Types: Codeine    Sexual activity: None    Other Topics Concern    None   Social History Narrative    None     Social Determinants of Health     Financial Resource Strain: Not on file   Food Insecurity: Not on file   Transportation Needs: Not on file   Physical Activity: Not on file   Stress: Not on file   Social Connections: Not on file   Intimate Partner Violence: Not on file   Housing Stability: Not on file     Past Medical History:   Diagnosis Date    Personal history of other diseases of the circulatory system     History of hypertension     Past Surgical History:   Procedure Laterality Date    BACK SURGERY  01/23/2018    Back Surgery    COLON SURGERY  07/09/2014    Colon Surgery    OTHER SURGICAL HISTORY  02/11/2019    Endoscopy       Charting was completed using voice recognition technology and may include unintended errors.

## 2024-01-22 ENCOUNTER — OFFICE VISIT (OUTPATIENT)
Dept: PAIN MEDICINE | Facility: CLINIC | Age: 74
End: 2024-01-22
Payer: COMMERCIAL

## 2024-01-22 DIAGNOSIS — M51.36 ANNULAR TEAR OF LUMBAR DISC: Primary | ICD-10-CM

## 2024-01-22 DIAGNOSIS — M20.42 HAMMER TOE OF LEFT FOOT: ICD-10-CM

## 2024-01-22 DIAGNOSIS — M50.30 DEGENERATION OF INTERVERTEBRAL DISC OF CERVICAL REGION: ICD-10-CM

## 2024-01-22 DIAGNOSIS — M51.36 DEGENERATION OF INTERVERTEBRAL DISC OF LUMBAR REGION: ICD-10-CM

## 2024-01-22 DIAGNOSIS — M54.32 BILATERAL SCIATICA: ICD-10-CM

## 2024-01-22 DIAGNOSIS — M54.31 BILATERAL SCIATICA: ICD-10-CM

## 2024-01-22 DIAGNOSIS — G60.9 IDIOPATHIC PERIPHERAL NEUROPATHY: ICD-10-CM

## 2024-01-22 DIAGNOSIS — M51.26 LUMBAR HERNIATED DISC: ICD-10-CM

## 2024-01-22 PROCEDURE — 1160F RVW MEDS BY RX/DR IN RCRD: CPT | Performed by: PHYSICAL MEDICINE & REHABILITATION

## 2024-01-22 PROCEDURE — 99214 OFFICE O/P EST MOD 30 MIN: CPT | Performed by: PHYSICAL MEDICINE & REHABILITATION

## 2024-01-22 PROCEDURE — 1159F MED LIST DOCD IN RCRD: CPT | Performed by: PHYSICAL MEDICINE & REHABILITATION

## 2024-01-22 RX ORDER — OXYCODONE AND ACETAMINOPHEN 10; 325 MG/1; MG/1
1 TABLET ORAL EVERY 6 HOURS PRN
Qty: 112 TABLET | Refills: 0 | Status: SHIPPED | OUTPATIENT
Start: 2024-02-22 | End: 2024-03-11 | Stop reason: SDUPTHER

## 2024-01-22 RX ORDER — OXYCODONE AND ACETAMINOPHEN 10; 325 MG/1; MG/1
1 TABLET ORAL EVERY 6 HOURS PRN
Qty: 112 TABLET | Refills: 0 | Status: SHIPPED | OUTPATIENT
Start: 2024-01-25 | End: 2024-02-22

## 2024-01-22 NOTE — PROGRESS NOTES
Chief complaint  Back and left leg  Hammer toes bilat worse on the left     History  Continues to have pain in the back and left leg   She has the radicular pain as before  The toes are hurting more and saw podiatry consider surgery she has bilateral hallux valgus  The pain in the left ankle is deep achy stabbing.  It is both on the medial and lateral MTP of the big toes. The    pain is associated with sensation of crunching upon range of motion and weightbearing, especially when standing and walking.  The pain is continuous at rest associated with stiffness with prolonged sitting and get worse with standing walking or any weightbearing activity, mainly when going up and down stairs.  Occasionally there is swelling around the ankle.  No changes in color or texture of the skin.  No pain proximal to the leg but there is sensation of tiredness.  With episodes of aggravation of the pain there are occasions of sensation of instability but no falls.   Bc of deformity she is having difficulties fitting in the shoes    Pain level without medication is 8/10 , with the medication pain level 5/10.     The pain meds are helping control the pain and improving Activities of Daily living and quality of life and quality of sleep.    opioids treatment agreement jan 2024  PDI (Pain Disability Index) score: 47  Oarrs pulled and scanned in the chart  no concerns  last urine toxicology testing earlier this year and it was compliant we will repeat  Xray updated spine   ORT Score is  0  Pain pathology and pain generators spine   Modalities tried injection, surgery, physical therapy, TENS unit, nonsteroidal anti-inflammatory medication       Denied any fever or chills. No weight loss and no night sweats. No cough or sputum production. No diarrhea   The constipation has been responding to fibers and over the counter medications.     No bladder and bowel incontinence and no other changes in bladder and bowel. No skin changes.  Reports  tiredness and fatigability only if the pain is not controlled.   Denied opioids diversion and abuse and denies alcoholism. Denies overuse of the pain medications.    The control of the pain with the pain medications is helping the control of the symptoms and allowing the function and activities of daily living, enjoyment of life, improving the quality of life and sleep with less interruption by the pain. The goal is symptomatic control of the nonmalignant chronic pain and not to repair the permanent damage in the tissues inducing the chronic pain conditions. We are aiming to shift the focus from the nonmalignant chronic pain to other aspects of life by symptomatically treating this chronic pain. If this pain is not treated it will lead to major morbidity and it is also associated with increased risks of mortality. The patient understands those very clearly and also understand high risks of morbidity and mortality if not strictly adherent to the treatment recommendations and reporting any associated side effects. Also patient understand the full responsibility associated with these medications to avoid abuse or overuse or any use of these medications for anything besides treating the patient's own chronic pain and nothing else under any circumstances.        Physical examination  Awake, alert and oriented for time place and persons   declined Chaperone for the visit and was adequately  draped for the exam.      Bilaterla hallux valgus  Slr at 50 deg on left  Negative ariana  plantar cutaneous reflex are down going bilaterally     Diagnosis  Problem List Items Addressed This Visit       Lumbar herniated disc    Relevant Medications    oxyCODONE-acetaminophen (Percocet)  mg tablet (Start on 1/25/2024)    oxyCODONE-acetaminophen (Percocet)  mg tablet (Start on 2/22/2024)    Degeneration of intervertebral disc of cervical region    Relevant Medications    oxyCODONE-acetaminophen (Percocet)  mg tablet  (Start on 1/25/2024)    oxyCODONE-acetaminophen (Percocet)  mg tablet (Start on 2/22/2024)    Degeneration of intervertebral disc of lumbar region    Relevant Medications    oxyCODONE-acetaminophen (Percocet)  mg tablet (Start on 1/25/2024)    oxyCODONE-acetaminophen (Percocet)  mg tablet (Start on 2/22/2024)    Idiopathic peripheral neuropathy    Sciatica    Relevant Medications    oxyCODONE-acetaminophen (Percocet)  mg tablet (Start on 1/25/2024)    oxyCODONE-acetaminophen (Percocet)  mg tablet (Start on 2/22/2024)    Annular tear of lumbar disc - Primary    Relevant Medications    oxyCODONE-acetaminophen (Percocet)  mg tablet (Start on 1/25/2024)    oxyCODONE-acetaminophen (Percocet)  mg tablet (Start on 2/22/2024)    Hammer toe of left foot        Plan  Reviewed the pain generators.  Went over the types of pain with neuropathic and nociceptive and different pathologies and therapeutic modalities. Discussed the mechanism of action of interventions from acupuncture, physical therapy , regular exercises, injections, botox, spinal cord stimulation, and role of surgery     Went over pathology of the intervertebral disc displacement and the anatomical relation to the Nerve roots and relation to the radicular symptoms. Went over treatment modalities with conservative treatment including acupuncture   and epidural steroid injection with fluoroscopy guidance and last resort of surgery    Based on the above findings and the clinical response to the opioids medications and improvement of the activities of daily living, sleep, and work performance. We made this complex decision to continue the opioids therapy in light of the evidence of the patient's responsibility in using the pain medications as prescribed for the nonmalignant chronic pain condition. We discussed about the use of the pain medications to treat the symptoms of chronic nonmalignant pain and we are not trying the  repair the permanent damage in the tissues, rather we are trying to control the symptoms induced by the permanent damage to the tissues inducing the chronic pain condition and resulting disability. I explained the difference and discussed it with the patient and stressed the importance of knowing the difference especially because of the potential side effects and the potential addicting effect and habit forming nature of the dangerous drugs we are using to treat the symptoms of the chronic pain.      We discussed that we are prescribing the medications on good laura and legitimate medical reason.     We reviewed the side effects and precautions of opioids prescriptions as discussed in the opioids treatment agreement.    realizes the interaction between the therapeutic classes including the respiratory depression and potential death     Random drug testing twice in 6 months we will submit     Oxycodone 10 qid and gabapentin 300 tid   has a narcan at home know how and when to use it if needed.   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 Wihtout pain control she can not work as she told me today again as before     Discussed about NSAIDS and I explained about the opioids sparing effect to allow keeping the opioids dose at minimal effective dose.   I went over the potential side effects of the NSAIDS on the gastrointestinal, renal and cardiovascular systems.      I detailed the side effects from the acetaminophen in the medication and made aware of those. I also explained about the cumulative effects on the organs and mainly the liver.     Given the opioids therapy , we discussed about the risk for accidental over dose on the pain medications, either for patient or other household. I went over the mechanism of action and mode of use of the Naloxone according to the  recommendations. I will provide a  prescription for a kit.     Follow-up 8 weeks or earlier if needed     The level of clinical decision making in this office visit,  is high, given the high risks of complications with the morbidity and mortality due to the fact that acute and chronic pain may pose a threat to life and bodily function, if under treated, poorly treated, or with failure to maintain adequate treatment and timely medical follow up. Additionally over treatment has its own set of complications including overdosing on the pain medications and also the habit forming potentials with the use of the medications used to treat chronic painful conditions including therapeutic classes classified as dangerous medications. Given the serious and fluctuating nature of pain level and instensity with extensive consideration for whenever pain changes, there is always the risk of prolonged functional impairment requiring close patient monitoring with regular assessments and reassessments and high level medical decision making at every office visit. The amount and complexity of data reviewed is high given the patient clinical presentation, labs,  data, radiology reports, and other tests as discussed during office visits. Pertinent data whether positive or negative were taken in consideration in the process of making this high level medical decision.

## 2024-01-30 DIAGNOSIS — M54.32 BILATERAL SCIATICA: ICD-10-CM

## 2024-01-30 DIAGNOSIS — M51.26 LUMBAR HERNIATED DISC: ICD-10-CM

## 2024-01-30 DIAGNOSIS — M51.36 ANNULAR TEAR OF LUMBAR DISC: ICD-10-CM

## 2024-01-30 DIAGNOSIS — M54.31 BILATERAL SCIATICA: ICD-10-CM

## 2024-01-30 DIAGNOSIS — M50.30 DEGENERATION OF INTERVERTEBRAL DISC OF CERVICAL REGION: ICD-10-CM

## 2024-01-30 DIAGNOSIS — M51.36 DEGENERATION OF INTERVERTEBRAL DISC OF LUMBAR REGION: ICD-10-CM

## 2024-01-30 RX ORDER — GABAPENTIN 300 MG/1
300 CAPSULE ORAL 3 TIMES DAILY
Qty: 90 CAPSULE | Refills: 1 | Status: SHIPPED | OUTPATIENT
Start: 2024-01-30 | End: 2024-03-11 | Stop reason: SDUPTHER

## 2024-03-06 ENCOUNTER — TELEPHONE (OUTPATIENT)
Dept: PRIMARY CARE | Facility: CLINIC | Age: 74
End: 2024-03-06

## 2024-03-06 NOTE — TELEPHONE ENCOUNTER
PT needs a new Cannula     [facility]  Phone number:  [phone]

## 2024-03-11 ENCOUNTER — OFFICE VISIT (OUTPATIENT)
Dept: PAIN MEDICINE | Facility: CLINIC | Age: 74
End: 2024-03-11
Payer: COMMERCIAL

## 2024-03-11 DIAGNOSIS — M51.26 LUMBAR HERNIATED DISC: ICD-10-CM

## 2024-03-11 DIAGNOSIS — M96.1 FAILED BACK SYNDROME OF LUMBAR SPINE: ICD-10-CM

## 2024-03-11 DIAGNOSIS — M51.36 DEGENERATION OF INTERVERTEBRAL DISC OF LUMBAR REGION: ICD-10-CM

## 2024-03-11 DIAGNOSIS — M51.36 ANNULAR TEAR OF LUMBAR DISC: ICD-10-CM

## 2024-03-11 DIAGNOSIS — Z79.891 LONG-TERM CURRENT USE OF OPIATE ANALGESIC: Primary | ICD-10-CM

## 2024-03-11 DIAGNOSIS — M50.30 DEGENERATION OF INTERVERTEBRAL DISC OF CERVICAL REGION: ICD-10-CM

## 2024-03-11 DIAGNOSIS — M54.31 BILATERAL SCIATICA: ICD-10-CM

## 2024-03-11 DIAGNOSIS — M54.32 BILATERAL SCIATICA: ICD-10-CM

## 2024-03-11 PROCEDURE — 1157F ADVNC CARE PLAN IN RCRD: CPT | Performed by: PHYSICAL MEDICINE & REHABILITATION

## 2024-03-11 PROCEDURE — 1159F MED LIST DOCD IN RCRD: CPT | Performed by: PHYSICAL MEDICINE & REHABILITATION

## 2024-03-11 PROCEDURE — 1160F RVW MEDS BY RX/DR IN RCRD: CPT | Performed by: PHYSICAL MEDICINE & REHABILITATION

## 2024-03-11 PROCEDURE — 99214 OFFICE O/P EST MOD 30 MIN: CPT | Performed by: PHYSICAL MEDICINE & REHABILITATION

## 2024-03-11 RX ORDER — OXYCODONE AND ACETAMINOPHEN 10; 325 MG/1; MG/1
1 TABLET ORAL EVERY 6 HOURS PRN
Qty: 112 TABLET | Refills: 0 | Status: SHIPPED | OUTPATIENT
Start: 2024-04-18 | End: 2024-05-13 | Stop reason: SDUPTHER

## 2024-03-11 RX ORDER — GABAPENTIN 300 MG/1
300 CAPSULE ORAL 3 TIMES DAILY
Qty: 90 CAPSULE | Refills: 1 | Status: SHIPPED | OUTPATIENT
Start: 2024-03-11 | End: 2024-05-13 | Stop reason: SDUPTHER

## 2024-03-11 RX ORDER — OXYCODONE AND ACETAMINOPHEN 10; 325 MG/1; MG/1
1 TABLET ORAL EVERY 6 HOURS PRN
Qty: 112 TABLET | Refills: 0 | Status: SHIPPED | OUTPATIENT
Start: 2024-03-21 | End: 2024-04-18

## 2024-03-11 NOTE — PROGRESS NOTES
Chief complaint  Back and lower limbs pain     History  Ms Weston is back for visit. Having agg of pain and wants STEVE. Those helped in past  The pain in the back is deep achy worse in the mid back area.  This is associated with tight muscle bands.  This limits the range of motion of the lumbar spine mainly in forward flexion.  The pain in the back is radiating around the side on the lateral aspect of the   thighs going down toward the lateral aspect of the legs into the lateral malleosli toward the lateral aspect of the feet towards the big toes.    This pain down to the lower limbs is more of a burning tingling sensation.  The pain in the   lower limbs worsens with bending forward or with any lifting, it improves with laying on the side and resting.  This is similar to the associated pain in the middle to lower back.  Denied any bowel or bladder incontinence.  With the worst pain there is tendency to catch the toe especially on carpeted area.  This occurs mostly when tired and toward the end of the day.    STEVE in past helped with pain over 80% and lasted over 8 months    Currently The pain is interfering with activities of daily living, quality of life and quality of sleep. It is limiting the functions and everything takes longer to complete because of the slowing related to the pain. Movements are cautious to avoid aggravation of the symptoms.       Pain level without medication is 8/10 , with the medication pain level 4 to 5 /10. Bcv of the  agg    The pain meds are helping control the pain and improving Activities of Daily living and quality of life and quality of sleep.    opioids treatment agreement Jan 2024  PDI (Pain Disability Index) score: 44  Oarrs pulled and scanned in the chart  no concerns  last urine toxicology testing earlier this year and it was compliant we will repeat  Xray updated spine   ORT Score is  0  Pain pathology and pain generators spine   Modalities tried injection, surgery, physical  therapy, TENS unit, nonsteroidal anti-inflammatory medication       Denied any fever or chills. No weight loss and no night sweats. No cough or sputum production. No diarrhea   The constipation has been responding to fibers and over the counter medications.     No bladder and bowel incontinence and no other changes in bladder and bowel. No skin changes.  Reports tiredness and fatigability only if the pain is not controlled.   Denied opioids diversion and abuse and denies alcoholism. Denies overuse of the pain medications.    The control of the pain with the pain medications is helping the control of the symptoms and allowing the function and activities of daily living, enjoyment of life, improving the quality of life and sleep with less interruption by the pain. The goal is symptomatic control of the nonmalignant chronic pain and not to repair the permanent damage in the tissues inducing the chronic pain conditions. We are aiming to shift the focus from the nonmalignant chronic pain to other aspects of life by symptomatically treating this chronic pain. If this pain is not treated it will lead to major morbidity and it is also associated with increased risks of mortality. The patient understands those very clearly and also understand high risks of morbidity and mortality if not strictly adherent to the treatment recommendations and reporting any associated side effects. Also patient understand the full responsibility associated with these medications to avoid abuse or overuse or any use of these medications for anything besides treating the patient's own chronic pain and nothing else under any circumstances.        Physical examination  Awake, alert and oriented for time place and persons   declined Chaperone for the visit and was adequately  draped for the exam.      There is decreased sensory to light touch on the lateral aspects of the thighs and around the   knee going down to the lateral aspect of the legs to  the   lateral malleoli into the dorsum of the feet..    Deep tendon reflexes is present for the patellar tendons bilaterally.  Achilles reflexes are present bilaterally and symmetric.    Medial Hamstrings reflex is decreased bilaterally  Plantar cutaneous are downgoing.  Ankle dorsiflexion is 5/5 bilaterally.  Plantar flexion of the ankles are 5/5 bilaterally.  Big toe extension is 4/5 bilaterally  Negative Tinel's sign over the right peroneal nerve at the fibular neck.  Ledy sign for axial loading and global rotation are negative.  No aberrant pain behavior.     Diagnosis  Problem List Items Addressed This Visit       Lumbar herniated disc    Relevant Medications    gabapentin (Neurontin) 300 mg capsule    oxyCODONE-acetaminophen (Percocet)  mg tablet (Start on 3/21/2024)    oxyCODONE-acetaminophen (Percocet)  mg tablet (Start on 4/18/2024)    Degeneration of intervertebral disc of cervical region    Relevant Medications    gabapentin (Neurontin) 300 mg capsule    oxyCODONE-acetaminophen (Percocet)  mg tablet (Start on 3/21/2024)    oxyCODONE-acetaminophen (Percocet)  mg tablet (Start on 4/18/2024)    Degeneration of intervertebral disc of lumbar region    Relevant Medications    gabapentin (Neurontin) 300 mg capsule    oxyCODONE-acetaminophen (Percocet)  mg tablet (Start on 3/21/2024)    oxyCODONE-acetaminophen (Percocet)  mg tablet (Start on 4/18/2024)    Failed back syndrome of lumbar spine    Relevant Orders    Transforaminal    Sciatica    Relevant Medications    gabapentin (Neurontin) 300 mg capsule    oxyCODONE-acetaminophen (Percocet)  mg tablet (Start on 3/21/2024)    oxyCODONE-acetaminophen (Percocet)  mg tablet (Start on 4/18/2024)    Other Relevant Orders    Transforaminal    Annular tear of lumbar disc    Relevant Medications    gabapentin (Neurontin) 300 mg capsule    oxyCODONE-acetaminophen (Percocet)  mg tablet (Start on 3/21/2024)     oxyCODONE-acetaminophen (Percocet)  mg tablet (Start on 4/18/2024)    Other Relevant Orders    Transforaminal    Long-term current use of opiate analgesic - Primary    Relevant Orders    Opiate/Opioid/Benzo Prescription Compliance        Plan  Reviewed the pain generators.  Went over the types of pain with neuropathic and nociceptive and different pathologies and therapeutic modalities. Discussed the mechanism of action of interventions from acupuncture, physical therapy , regular exercises, injections, botox, spinal cord stimulation, and role of surgery     Went over pathology of the intervertebral disc displacement and the anatomical relation to the Nerve roots and relation to the radicular symptoms. Went over treatment modalities with conservative treatment including acupuncture   and epidural steroid injection with fluoroscopy guidance and last resort of surgery    Based on the above findings and the clinical response to the opioids medications and improvement of the activities of daily living, sleep, and work performance. We made this complex decision to continue the opioids therapy in light of the evidence of the patient's responsibility in using the pain medications as prescribed for the nonmalignant chronic pain condition. We discussed about the use of the pain medications to treat the symptoms of chronic nonmalignant pain and we are not trying the repair the permanent damage in the tissues, rather we are trying to control the symptoms induced by the permanent damage to the tissues inducing the chronic pain condition and resulting disability. I explained the difference and discussed it with the patient and stressed the importance of knowing the difference especially because of the potential side effects and the potential addicting effect and habit forming nature of the dangerous drugs we are using to treat the symptoms of the chronic pain.      We discussed that we are prescribing the medications on  good laura and legitimate medical reason.     We reviewed the side effects and precautions of opioids prescriptions as discussed in the opioids treatment agreement.    realizes the interaction between the therapeutic classes including the respiratory depression and potential death     Random drug testing twice in 6 months we will submit     Consider STEVE with bilateral L5 Transforaminal epidural steroid injection      Risks not limited to infection, sepsis, abscess, bleeding , nerve injury, paralysis, and death...   Will request PA and once approved will call Ms Weston to schedule  Oxycodone qid and jamal 300 mg TID   has a narcan at home know how and when to use it if needed.  Discussed about considering cut back on pain medications     post procedure care was discussed with the patient who agreed to proceed.  procedure tolerated very well. No complications encountered.      Discussed about NSAIDS and I explained about the opioids sparing effect to allow keeping the opioids dose at minimal effective dose.   I went over the potential side effects of the NSAIDS on the gastrointestinal, renal and cardiovascular systems.      I detailed the side effects from the acetaminophen in the medication and made aware of those. I also explained about the cumulative effects on the organs and mainly the liver.     Given the opioids therapy , we discussed about the risk for accidental over dose on the pain medications, either for patient or other household. I went over the mechanism of action and mode of use of the Naloxone according to the  recommendations. I will provide a prescription for a kit.     Follow-up 8 weeks or earlier if needed     The level of clinical decision making in this office visit,  is high, given the high risks of complications with the morbidity and mortality due to the fact that acute and chronic pain may pose a threat to life and bodily function, if under treated, poorly treated, or with failure  to maintain adequate treatment and timely medical follow up. Additionally over treatment has its own set of complications including overdosing on the pain medications and also the habit forming potentials with the use of the medications used to treat chronic painful conditions including therapeutic classes classified as dangerous medications. Given the serious and fluctuating nature of pain level and instensity with extensive consideration for whenever pain changes, there is always the risk of prolonged functional impairment requiring close patient monitoring with regular assessments and reassessments and high level medical decision making at every office visit. The amount and complexity of data reviewed is high given the patient clinical presentation, labs,  data, radiology reports, and other tests as discussed during office visits. Pertinent data whether positive or negative were taken in consideration in the process of making this high level medical decision.

## 2024-03-14 ENCOUNTER — TELEPHONE (OUTPATIENT)
Dept: PAIN MEDICINE | Facility: CLINIC | Age: 74
End: 2024-03-14
Payer: COMMERCIAL

## 2024-03-14 NOTE — TELEPHONE ENCOUNTER
Pending denial for [id number]  Missing clinical:   Ongoing active conservative treatment (PT, physician directed home exercise plan, or chiropractic care) since the last injection or documentation of the medical reason this treatment can't be done.

## 2024-03-25 NOTE — TELEPHONE ENCOUNTER
Dr. Ennis denial for [id number]  Notes needed from  That patient is in active rehab program, home exercise or functional restoration since the last injection.  Spoke with patient and she stated she is not doing anything. She wants to know what the next step is.

## 2024-03-28 ENCOUNTER — TELEPHONE (OUTPATIENT)
Dept: PAIN MEDICINE | Facility: CLINIC | Age: 74
End: 2024-03-28
Payer: COMMERCIAL

## 2024-03-28 NOTE — TELEPHONE ENCOUNTER
Patient calling to inform you that she will not be having PT because it hurts so bad and she has to go 10 times and she will have to pay $25 per visit and she can't afford that.

## 2024-04-29 ENCOUNTER — TELEPHONE (OUTPATIENT)
Dept: PRIMARY CARE | Facility: CLINIC | Age: 74
End: 2024-04-29
Payer: COMMERCIAL

## 2024-04-29 DIAGNOSIS — F41.8 ANXIETY ASSOCIATED WITH DEPRESSION: ICD-10-CM

## 2024-04-30 RX ORDER — ESCITALOPRAM OXALATE 20 MG/1
20 TABLET ORAL DAILY
Qty: 90 TABLET | Refills: 2 | Status: SHIPPED | OUTPATIENT
Start: 2024-04-30

## 2024-05-13 ENCOUNTER — HOSPITAL ENCOUNTER (OUTPATIENT)
Dept: RADIOLOGY | Facility: CLINIC | Age: 74
Discharge: HOME | End: 2024-05-13
Payer: COMMERCIAL

## 2024-05-13 ENCOUNTER — OFFICE VISIT (OUTPATIENT)
Dept: PAIN MEDICINE | Facility: CLINIC | Age: 74
End: 2024-05-13
Payer: COMMERCIAL

## 2024-05-13 DIAGNOSIS — M50.30 DEGENERATION OF INTERVERTEBRAL DISC OF CERVICAL REGION: ICD-10-CM

## 2024-05-13 DIAGNOSIS — M54.16 LEFT LUMBAR RADICULOPATHY: ICD-10-CM

## 2024-05-13 DIAGNOSIS — M51.26 LUMBAR HERNIATED DISC: ICD-10-CM

## 2024-05-13 DIAGNOSIS — M54.32 BILATERAL SCIATICA: ICD-10-CM

## 2024-05-13 DIAGNOSIS — M54.31 BILATERAL SCIATICA: ICD-10-CM

## 2024-05-13 DIAGNOSIS — M51.36 DEGENERATION OF INTERVERTEBRAL DISC OF LUMBAR REGION: ICD-10-CM

## 2024-05-13 DIAGNOSIS — M51.36 ANNULAR TEAR OF LUMBAR DISC: ICD-10-CM

## 2024-05-13 DIAGNOSIS — M47.816 LUMBAR SPONDYLOSIS: Primary | ICD-10-CM

## 2024-05-13 PROCEDURE — 72120 X-RAY BEND ONLY L-S SPINE: CPT

## 2024-05-13 PROCEDURE — 99214 OFFICE O/P EST MOD 30 MIN: CPT | Performed by: PHYSICAL MEDICINE & REHABILITATION

## 2024-05-13 PROCEDURE — 1159F MED LIST DOCD IN RCRD: CPT | Performed by: PHYSICAL MEDICINE & REHABILITATION

## 2024-05-13 PROCEDURE — 1160F RVW MEDS BY RX/DR IN RCRD: CPT | Performed by: PHYSICAL MEDICINE & REHABILITATION

## 2024-05-13 PROCEDURE — 1157F ADVNC CARE PLAN IN RCRD: CPT | Performed by: PHYSICAL MEDICINE & REHABILITATION

## 2024-05-13 PROCEDURE — 72110 X-RAY EXAM L-2 SPINE 4/>VWS: CPT | Performed by: STUDENT IN AN ORGANIZED HEALTH CARE EDUCATION/TRAINING PROGRAM

## 2024-05-13 RX ORDER — OXYCODONE AND ACETAMINOPHEN 10; 325 MG/1; MG/1
1 TABLET ORAL EVERY 6 HOURS PRN
Qty: 112 TABLET | Refills: 0 | Status: SHIPPED | OUTPATIENT
Start: 2024-06-13 | End: 2024-07-11

## 2024-05-13 RX ORDER — OXYCODONE AND ACETAMINOPHEN 10; 325 MG/1; MG/1
1 TABLET ORAL EVERY 6 HOURS PRN
Qty: 112 TABLET | Refills: 0 | Status: SHIPPED | OUTPATIENT
Start: 2024-05-16 | End: 2024-06-13

## 2024-05-13 RX ORDER — GABAPENTIN 300 MG/1
300 CAPSULE ORAL 3 TIMES DAILY
Qty: 90 CAPSULE | Refills: 1 | Status: SHIPPED | OUTPATIENT
Start: 2024-05-13 | End: 2024-06-12

## 2024-05-13 NOTE — PROGRESS NOTES
Chief complaint  Back and lower limbs pain     History  Abi Weston is back for pain management office visit  The insurance denied STEVE and requested PT but she tried pT and had aggravation  of the pain and also could not afford it  She did not want MRI. Went over the xray from 2022 and will need to update    Dw her about the pain meds  Long discussion about putting effort in cutting back on pain medications. Discussed about pain level changing and we do not know if the medications at this amount are still needed until we try and cut back slowly on pain medications. If the cut is tolerated then we continue. If cut not tolerated then, will go back on the pain medications level.  The goal from this is to keep the pain medications at the lowest effective dose.     I discussed about  about considering increasing the dose of the long acting and cut back the number of doses of the short acting medications. That will decrease the number of doses being dispensed daily.     Pain level without medication is 8/10 , with the medication pain level 3/10.     The pain meds are helping control the pain and improving Activities of Daily living and quality of life and quality of sleep.    opioids treatment agreement Jan 2024  Pill count today, using count tray, and in front of patient :  11    pills , last fill was on 4/18  for 112 tabs,  the count is correct  Oarrs pulled and scanned in the chart  no concerns  last urine toxicology testing earlier this year and it was compliant we will repeat she has positive oxycodone nad opiates (that is from the oxycodone  Xray updated spine  ORT Score is  0  Pain pathology and pain generators spine   Modalities tried injection, surgery, physical therapy, TENS unit, nonsteroidal anti-inflammatory medication       Denied any fever or chills. No weight loss and no night sweats. No cough or sputum production. No diarrhea   The constipation has been responding to fibers and over the counter  medications.     No bladder and bowel incontinence and no other changes in bladder and bowel. No skin changes.  Reports tiredness and fatigability only if the pain is not controlled.   Denied opioids diversion and abuse and denies alcoholism. Denies overuse of the pain medications.    The control of the pain with the pain medications is helping the control of the symptoms and allowing the function and activities of daily living, enjoyment of life, improving the quality of life and sleep with less interruption by the pain. The goal is symptomatic control of the nonmalignant chronic pain and not to repair the permanent damage in the tissues inducing the chronic pain conditions. We are aiming to shift the focus from the nonmalignant chronic pain to other aspects of life by symptomatically treating this chronic pain. If this pain is not treated it will lead to major morbidity and it is also associated with increased risks of mortality. The patient understands those very clearly and also understand high risks of morbidity and mortality if not strictly adherent to the treatment recommendations and reporting any associated side effects. Also patient understand the full responsibility associated with these medications to avoid abuse or overuse or any use of these medications for anything besides treating the patient's own chronic pain and nothing else under any circumstances.        Physical examination  Awake, alert and oriented for time place and persons   declined Chaperone for the visit and was adequately  draped for the exam.    There is decreased sensory to light touch on the lateral aspects of the thighs and around the   knee going down to the lateral aspect of the legs to the   lateral malleoli into the dorsum of the feet..    Deep tendon reflexes is present for the patellar tendons bilaterally.  Achilles reflexes are present bilaterally and symmetric.    Medial Hamstrings reflex is decreased bilaterally  Plantar  cutaneous are downgoing.  Ankle dorsiflexion is 5/5 bilaterally.  Plantar flexion of the ankles are 5/5 bilaterally.  Big toe extension is 4/5 bilaterally  Negative Tinel's sign over the right peroneal nerve at the fibular neck.  Ledy sign for axial loading and global rotation are negative.  No aberrant pain behavior.     Diagnosis  Problem List Items Addressed This Visit       Left lumbar radiculopathy    Relevant Orders    XR lumbar spine 4+ views w flexion extension    Lumbar herniated disc    Relevant Medications    gabapentin (Neurontin) 300 mg capsule    oxyCODONE-acetaminophen (Percocet)  mg tablet (Start on 5/16/2024)    oxyCODONE-acetaminophen (Percocet)  mg tablet (Start on 6/13/2024)    Other Relevant Orders    XR lumbar spine 4+ views w flexion extension    Degeneration of intervertebral disc of cervical region    Relevant Medications    gabapentin (Neurontin) 300 mg capsule    oxyCODONE-acetaminophen (Percocet)  mg tablet (Start on 5/16/2024)    oxyCODONE-acetaminophen (Percocet)  mg tablet (Start on 6/13/2024)    Degeneration of intervertebral disc of lumbar region    Relevant Medications    gabapentin (Neurontin) 300 mg capsule    oxyCODONE-acetaminophen (Percocet)  mg tablet (Start on 5/16/2024)    oxyCODONE-acetaminophen (Percocet)  mg tablet (Start on 6/13/2024)    Sciatica    Relevant Medications    gabapentin (Neurontin) 300 mg capsule    oxyCODONE-acetaminophen (Percocet)  mg tablet (Start on 5/16/2024)    oxyCODONE-acetaminophen (Percocet)  mg tablet (Start on 6/13/2024)    Lumbar spondylosis - Primary    Annular tear of lumbar disc    Relevant Medications    gabapentin (Neurontin) 300 mg capsule    oxyCODONE-acetaminophen (Percocet)  mg tablet (Start on 5/16/2024)    oxyCODONE-acetaminophen (Percocet)  mg tablet (Start on 6/13/2024)        Plan  Reviewed the pain generators.  Went over the types of pain with neuropathic and  nociceptive and different pathologies and therapeutic modalities. Discussed the mechanism of action of interventions from acupuncture, physical therapy , regular exercises, injections, botox, spinal cord stimulation, and role of surgery     Went over pathology of the intervertebral disc displacement and the anatomical relation to the Nerve roots and relation to the radicular symptoms. Went over treatment modalities with conservative treatment including acupuncture   and epidural steroid injection with fluoroscopy guidance and last resort of surgery    Based on the above findings and the clinical response to the opioids medications and improvement of the activities of daily living, sleep, and work performance. We made this complex decision to continue the opioids therapy in light of the evidence of the patient's responsibility in using the pain medications as prescribed for the nonmalignant chronic pain condition. We discussed about the use of the pain medications to treat the symptoms of chronic nonmalignant pain and we are not trying the repair the permanent damage in the tissues, rather we are trying to control the symptoms induced by the permanent damage to the tissues inducing the chronic pain condition and resulting disability. I explained the difference and discussed it with the patient and stressed the importance of knowing the difference especially because of the potential side effects and the potential addicting effect and habit forming nature of the dangerous drugs we are using to treat the symptoms of the chronic pain.      We discussed that we are prescribing the medications on good laura and legitimate medical reason.     We reviewed the side effects and precautions of opioids prescriptions as discussed in the opioids treatment agreement.    realizes the interaction between the therapeutic classes including the respiratory depression and potential death     Random drug testing twice in 6 months we will  submit     Xray of [place]  Asked her to try PT again   Continue with oxycodone 10 qid and jamal 300 mg tid realizes the interaction between the therapeutic classes including the respiratory depression and potential death  Discussed about considering cut back on pain medications  has a narcan at home know how and when to use it if needed.     Discussed about NSAIDS and I explained about the opioids sparing effect to allow keeping the opioids dose at minimal effective dose.   I went over the potential side effects of the NSAIDS on the gastrointestinal, renal and cardiovascular systems.      I detailed the side effects from the acetaminophen in the medication and made aware of those. I also explained about the cumulative effects on the organs and mainly the liver.     Given the opioids therapy , we discussed about the risk for accidental over dose on the pain medications, either for patient or other household. I went over the mechanism of action and mode of use of the Naloxone according to the  recommendations. I will provide a prescription for a kit.     Follow-up 8 weeks or earlier if needed     The level of clinical decision making in this office visit,  is high, given the high risks of complications with the morbidity and mortality due to the fact that acute and chronic pain may pose a threat to life and bodily function, if under treated, poorly treated, or with failure to maintain adequate treatment and timely medical follow up. Additionally over treatment has its own set of complications including overdosing on the pain medications and also the habit forming potentials with the use of the medications used to treat chronic painful conditions including therapeutic classes classified as dangerous medications. Given the serious and fluctuating nature of pain level and instensity with extensive consideration for whenever pain changes, there is always the risk of prolonged functional impairment requiring  close patient monitoring with regular assessments and reassessments and high level medical decision making at every office visit. The amount and complexity of data reviewed is high given the patient clinical presentation, labs,  data, radiology reports, and other tests as discussed during office visits. Pertinent data whether positive or negative were taken in consideration in the process of making this high level medical decision.

## 2024-06-20 ENCOUNTER — TELEPHONE (OUTPATIENT)
Dept: PRIMARY CARE | Facility: CLINIC | Age: 74
End: 2024-06-20
Payer: COMMERCIAL

## 2024-06-20 DIAGNOSIS — J43.2 CENTRILOBULAR EMPHYSEMA (MULTI): ICD-10-CM

## 2024-06-20 DIAGNOSIS — R06.02 SOB (SHORTNESS OF BREATH): ICD-10-CM

## 2024-06-24 RX ORDER — ALBUTEROL SULFATE 90 UG/1
2 AEROSOL, METERED RESPIRATORY (INHALATION) 2 TIMES DAILY
Qty: 18 G | Refills: 2 | Status: SHIPPED | OUTPATIENT
Start: 2024-06-24

## 2024-07-09 ENCOUNTER — APPOINTMENT (OUTPATIENT)
Dept: PAIN MEDICINE | Facility: CLINIC | Age: 74
End: 2024-07-09
Payer: COMMERCIAL

## 2024-07-09 ENCOUNTER — OFFICE VISIT (OUTPATIENT)
Dept: PRIMARY CARE | Facility: CLINIC | Age: 74
End: 2024-07-09
Payer: COMMERCIAL

## 2024-07-09 VITALS
HEART RATE: 72 BPM | SYSTOLIC BLOOD PRESSURE: 110 MMHG | BODY MASS INDEX: 18.64 KG/M2 | HEIGHT: 68 IN | DIASTOLIC BLOOD PRESSURE: 80 MMHG | WEIGHT: 123 LBS | OXYGEN SATURATION: 93 %

## 2024-07-09 DIAGNOSIS — M54.32 BILATERAL SCIATICA: ICD-10-CM

## 2024-07-09 DIAGNOSIS — M54.16 LEFT LUMBAR RADICULOPATHY: ICD-10-CM

## 2024-07-09 DIAGNOSIS — M54.12 CERVICAL RADICULITIS: ICD-10-CM

## 2024-07-09 DIAGNOSIS — Z79.891 LONG TERM CURRENT USE OF OPIATE ANALGESIC: ICD-10-CM

## 2024-07-09 DIAGNOSIS — G89.29 CHRONIC RIGHT SHOULDER PAIN: Primary | ICD-10-CM

## 2024-07-09 DIAGNOSIS — M51.26 LUMBAR HERNIATED DISC: ICD-10-CM

## 2024-07-09 DIAGNOSIS — M50.30 DEGENERATION OF INTERVERTEBRAL DISC OF CERVICAL REGION: ICD-10-CM

## 2024-07-09 DIAGNOSIS — M51.36 DEGENERATION OF INTERVERTEBRAL DISC OF LUMBAR REGION: ICD-10-CM

## 2024-07-09 DIAGNOSIS — M51.36 ANNULAR TEAR OF LUMBAR DISC: Primary | ICD-10-CM

## 2024-07-09 DIAGNOSIS — M54.31 BILATERAL SCIATICA: ICD-10-CM

## 2024-07-09 DIAGNOSIS — M25.511 CHRONIC RIGHT SHOULDER PAIN: Primary | ICD-10-CM

## 2024-07-09 PROCEDURE — 1160F RVW MEDS BY RX/DR IN RCRD: CPT | Performed by: FAMILY MEDICINE

## 2024-07-09 PROCEDURE — 99214 OFFICE O/P EST MOD 30 MIN: CPT | Performed by: PHYSICAL MEDICINE & REHABILITATION

## 2024-07-09 PROCEDURE — 1159F MED LIST DOCD IN RCRD: CPT | Performed by: FAMILY MEDICINE

## 2024-07-09 PROCEDURE — 1157F ADVNC CARE PLAN IN RCRD: CPT | Performed by: FAMILY MEDICINE

## 2024-07-09 PROCEDURE — 3074F SYST BP LT 130 MM HG: CPT | Performed by: FAMILY MEDICINE

## 2024-07-09 PROCEDURE — 1157F ADVNC CARE PLAN IN RCRD: CPT | Performed by: PHYSICAL MEDICINE & REHABILITATION

## 2024-07-09 PROCEDURE — 3079F DIAST BP 80-89 MM HG: CPT | Performed by: FAMILY MEDICINE

## 2024-07-09 PROCEDURE — 99213 OFFICE O/P EST LOW 20 MIN: CPT | Performed by: FAMILY MEDICINE

## 2024-07-09 RX ORDER — GABAPENTIN 300 MG/1
300 CAPSULE ORAL 3 TIMES DAILY
Qty: 90 CAPSULE | Refills: 1 | Status: SHIPPED | OUTPATIENT
Start: 2024-07-09 | End: 2024-08-08

## 2024-07-09 RX ORDER — OXYCODONE AND ACETAMINOPHEN 10; 325 MG/1; MG/1
1 TABLET ORAL EVERY 6 HOURS PRN
Qty: 112 TABLET | Refills: 0 | Status: SHIPPED | OUTPATIENT
Start: 2024-07-10 | End: 2024-08-07

## 2024-07-09 RX ORDER — OXYCODONE AND ACETAMINOPHEN 10; 325 MG/1; MG/1
1 TABLET ORAL EVERY 6 HOURS PRN
Qty: 112 TABLET | Refills: 0 | Status: SHIPPED | OUTPATIENT
Start: 2024-08-07 | End: 2024-09-04

## 2024-07-09 NOTE — PROGRESS NOTES
Subjective   Patient ID: Abi Weston is a 74 y.o. female who presents for Shoulder Injury ([place] right shoulder pop last thurs while vacuuming ).    Assessment/Plan     Problem List Items Addressed This Visit    None  Visit Diagnoses       Chronic right shoulder pain    -  Primary    Relevant Orders    XR shoulder right 2+ views            HPI    74-year-old female complaining of right shoulder popping sensation last Thursday when she was doing vacuum inside her house no fall    Patient is here after that she had a little black and blue of the right arm now which is going all the way through the right forearm  But improving  Complaining of movement restriction    Probably underlying osteoarthritis concern for dislocation    Will consider x-ray  Patient is on Percocet from pain management for chronic back pain  Will continue    Will call patient abnormal result we may refer patient to orthopedic surgery and physical therapy    Allergies   Allergen Reactions    Cefazolin Hives     Burn hives    red blotches       Current Outpatient Medications   Medication Sig Dispense Refill    albuterol 90 mcg/actuation inhaler Inhale 2 puffs 2 times a day. 18 g 2    apixaban (Eliquis) 5 mg tablet Take 1 tablet (5 mg) by mouth 2 times a day. 180 tablet 2    atorvastatin (Lipitor) 40 mg tablet TAKE 1 TABLET DAILY 90 tablet 2    biotin 10 mg tablet Take by mouth.      chlorthalidone (Hygroton) 25 mg tablet TAKE 1 TABLET DAILY 90 tablet 2    cholecalciferol (Vitamin D-3) 50 mcg (2,000 unit) capsule Take by mouth.      escitalopram (Lexapro) 20 mg tablet Take 1 tablet (20 mg) by mouth once daily. 90 tablet 2    lisinopril 10 mg tablet Take 1 tablet (10 mg) by mouth once daily.      metoprolol succinate XL (Toprol-XL) 25 mg 24 hr tablet TAKE 1 TABLET DAILY 90 tablet 1    multivitamin with minerals (HAIR,SKIN AND NAILS ORAL) Hair skin and nails supplement      naloxone (Narcan) 4 mg/0.1 mL nasal spray Administer into affected  "nostril(s).      oxyCODONE-acetaminophen (Percocet)  mg tablet Take 1 tablet by mouth every 6 hours if needed for severe pain (7 - 10) for up to 28 days. Do not fill before June 13, 2024. 112 tablet 0    pantoprazole (ProtoNix) 40 mg EC tablet TAKE 1 TABLET DAILY 90 tablet 1    gabapentin (Neurontin) 300 mg capsule Take 1 capsule (300 mg) by mouth 3 times a day. 90 capsule 1    hydrocortisone-acetic acid (Vosol-HC) otic solution Administer 3 drops into each ear 3 times a day as needed (itching).      levothyroxine (Synthroid, Levoxyl) 100 mcg tablet Take 1 tablet (100 mcg) by mouth once daily.      levothyroxine (Synthroid, Levoxyl) 75 mcg tablet TAKE 1 TABLET DAILY AS     DIRECTED 90 tablet 2     No current facility-administered medications for this visit.       Objective   Visit Vitals  /80 (BP Location: Left arm, Patient Position: Sitting)   Pulse 72   Ht 1.727 m (5' 8\")   Wt 55.8 kg (123 lb)   SpO2 93%   BMI 18.70 kg/m²   Smoking Status Every Day   BSA 1.64 m²     Physical Exam  Constitutional:       General: He is not in acute distress.     Appearance: Normal appearance.   HENT:      Head: Normocephalic and atraumatic.      Nose: Nose normal.   Eyes:      Extraocular Movements: Extraocular movements intact.      Conjunctiva/sclera: Conjunctivae normal.   Cardiovascular:      Rate and Rhythm: Normal rate and regular rhythm.   Pulmonary:      Effort: Pulmonary effort is normal.      Breath sounds: Normal breath sounds.   Skin:     General: Skin is warm.   Neurological:      Mental Status: He is alert and oriented to person, place, and time.   Psychiatric:         Mood and Affect: Mood normal.         Behavior: Behavior normal.     Right shoulder movement abduction restricted  Internal rotation restricted  Right arm and forearm purple discoloration  Immunization History   Administered Date(s) Administered    Moderna COVID-19 vaccine, bivalent, blue cap/gray label *Check age/dose* 12/20/2022    " Pneumococcal conjugate vaccine, 13-valent (PREVNAR 13) 09/04/2019    Pneumococcal polysaccharide vaccine, 23-valent, age 2 years and older (PNEUMOVAX 23) 08/29/2014, 02/16/2021    Td vaccine, age 7 years and older (TDVAX) 10/26/2011    Td vaccine, age 7 years and older (TENIVAC) 03/11/2016       Review of Systems    No visits with results within 4 Month(s) from this visit.   Latest known visit with results is:   Legacy Encounter on 01/19/2023   Component Date Value Ref Range Status    DRUG SCREEN COMMENT URINE 01/19/2023 SEE BELOW   Final    Creatine, Urine 01/19/2023 125.5  mg/dL Final    Amphetamine Screen, Urine 01/19/2023 PRESUMPTIVE NEGATIVE  NEGATIVE Final    Barbiturate Screen, Urine 01/19/2023 PRESUMPTIVE NEGATIVE  NEGATIVE Final    Cannabinoid Screen, Urine 01/19/2023 PRESUMPTIVE NEGATIVE  NEGATIVE Final    Cocaine Screen, Urine 01/19/2023 PRESUMPTIVE NEGATIVE  NEGATIVE Final    PCP Screen, Urine 01/19/2023 PRESUMPTIVE NEGATIVE  NEGATIVE Final    7-Aminoclonazepam 01/19/2023 <25  Cutoff <25 ng/mL Final    Alpha-Hydroxyalprazolam 01/19/2023 <25  Cutoff <25 ng/mL Final    Alpha-Hydroxymidazolam 01/19/2023 <25  Cutoff <25 ng/mL Final    Alprazolam 01/19/2023 <25  Cutoff <25 ng/mL Final    Chlordiazepoxide 01/19/2023 <25  Cutoff <25 ng/mL Final    Clonazepam 01/19/2023 <25  Cutoff <25 ng/mL Final    Diazepam 01/19/2023 <25  Cutoff <25 ng/mL Final    Lorazepam 01/19/2023 <25  Cutoff <25 ng/mL Final    Midazolam 01/19/2023 <25  Cutoff <25 ng/mL Final    Nordiazepam 01/19/2023 <25  Cutoff <25 ng/mL Final    Oxazepam 01/19/2023 <25  Cutoff <25 ng/mL Final    Temazepam 01/19/2023 <25  Cutoff <25 ng/mL Final    Zolpidem 01/19/2023 <25  Cutoff <25 ng/mL Final    Zolpidem Metabolite (ZCA) 01/19/2023 <25  Cutoff <25 ng/mL Final    6-Acetylmorphine 01/19/2023 <25  Cutoff <25 ng/mL Final    Codeine 01/19/2023 <50  Cutoff <50 ng/mL Final    Hydrocodone 01/19/2023 <25  Cutoff <25 ng/mL Final    Hydromorphone 01/19/2023 <25   Cutoff <25 ng/mL Final    Morphine Urine 01/19/2023 <50  Cutoff <50 ng/mL Final    Norhydrocodone 01/19/2023 <25  Cutoff <25 ng/mL Final    Noroxycodone 01/19/2023 >1000 (A)  Cutoff <25 ng/mL Final    Oxycodone 01/19/2023 1,262 (A)  Cutoff <25 ng/mL Final    Oxymorphone 01/19/2023 31 (A)  Cutoff <25 ng/mL Final    Tramadol 01/19/2023 <50  Cutoff <50 ng/mL Final    O-Desmethyltramadol 01/19/2023 <50  Cutoff <50 ng/mL Final    Fentanyl 01/19/2023 <2.5  Cutoff<2.5 ng/mL Final    Norfentanyl 01/19/2023 <2.5  Cutoff<2.5 ng/mL Final    METHADONE CONFIRMATION,URINE 01/19/2023 <25  Cutoff <25 ng/mL Final    EDDP 01/19/2023 <25  Cutoff <25 ng/mL Final       Radiology: Reviewed imaging in powerchart.  No results found.    Family History   Problem Relation Name Age of Onset    Diabetes Mother      Hypertension Mother      Stroke Father       Social History     Socioeconomic History    Marital status:      Spouse name: None    Number of children: None    Years of education: None    Highest education level: None   Occupational History    None   Tobacco Use    Smoking status: Every Day     Current packs/day: 0.50     Average packs/day: 0.5 packs/day for 30.0 years (15.0 ttl pk-yrs)     Types: Cigarettes    Smokeless tobacco: Never    Tobacco comments:     I will sneak a couple here and there   Substance and Sexual Activity    Alcohol use: Never    Drug use: Yes     Types: Codeine    Sexual activity: None   Other Topics Concern    None   Social History Narrative    None     Social Determinants of Health     Financial Resource Strain: Not on file   Food Insecurity: Not on file   Transportation Needs: Not on file   Physical Activity: Not on file   Stress: Not on file   Social Connections: Not on file   Intimate Partner Violence: Not on file   Housing Stability: Not on file     Past Medical History:   Diagnosis Date    Personal history of other diseases of the circulatory system     History of hypertension     Past Surgical  History:   Procedure Laterality Date    BACK SURGERY  01/23/2018    Back Surgery    COLON SURGERY  07/09/2014    Colon Surgery    OTHER SURGICAL HISTORY  02/11/2019    Endoscopy       Charting was completed using voice recognition technology and may include unintended errors.

## 2024-07-09 NOTE — PROGRESS NOTES
Chief complaint  Back pain   R shoulder pain     History  Abi Weston is back for pain management office visit  She was vaccuming and felt snap over R shoulder and now having hematoma in the RUL  Pain is in the shoulder and also hematoma  Continue with pain in the back and legs  The pain in the back is deep achy worse in the mid back area.  This is associated with tight muscle bands.  This limits the range of motion of the lumbar spine mainly in forward flexion.  The pain in the back is radiating around the side on the lateral aspect of the right thigh going down toward the lateral aspect of the right leg into the lateral malleolus toward the lateral aspect of the foot towards the big toe.    This pain down to the right lower limb is more of a burning tingling sensation.  The pain in the right lower limb worsens with bending forward or with any lifting, it improves with laying on the side and resting.  This is similar to the associated pain in the middle to lower back.  Denied any bowel or bladder incontinence.  With the worst pain there is tendency to catch the toe especially on carpeted area.  This occurs mostly when tired and toward the end of the day.    The skin is intact with no breakdown.  No vesicles.       Pain level without medication is 8/10 , with the medication pain level 3/10. 3 for back and 5/10 for shoulder     The pain meds are helping control the pain and improving Activities of Daily living and quality of life and quality of sleep.    opioids treatment agreement Jan 2024     Oarrs pulled and reviewed, no concerns  last urine toxicology testing earlier this year and it was compliant we will repeat  Xray updated spine   ORT Score is  0  Pain pathology and pain generators spine   Modalities tried injection, surgery, physical therapy, TENS unit, nonsteroidal anti-inflammatory medication       Review of Systems :  Denied any fever or chills. No weight loss and no night sweats. No cough or sputum  "production. No diarrhea   The constipation has been responding to fibers and over the counter medications.     No bladder and bowel incontinence and no other changes in bladder and bowel. No skin changes.  Reports tiredness and fatigability only if the pain is not controlled.   Denied opioids diversion and abuse and denies alcoholism. Denies overuse of the pain medications.  No reported euphoria sensation or getting a \"high\" on the pain medications.    The control of the pain with the pain medications is helping the control of the symptoms and allowing the function and activities of daily living, enjoyment of life, improving the quality of life and sleep with less interruption by the pain. The goal is symptomatic control of the nonmalignant chronic pain and not to repair the permanent damage in the tissues inducing the chronic pain conditions. We are aiming to shift the focus from the nonmalignant chronic pain to other aspects of life by symptomatically treating this chronic pain. If this pain is not treated it will lead to major morbidity and it is also associated with increased risks of mortality. The patient understands those very clearly and also understand high risks of morbidity and mortality if not strictly adherent to the treatment recommendations and reporting any associated side effects. Also patient understand the full responsibility associated with these medications to avoid abuse or overuse or any use of these medications for anything besides treating the patient's own chronic pain and nothing else under any circumstances.        Physical examination  Awake, alert and oriented for time place and persons   declined Chaperone for the visit and was adequately  draped for the exam.    Examination of the lumbar spine showed tight muscle bands in the mid and lower back area, this is more pronounced on the right compared to the left.  Additionally, this is inducing mild reversal of the lumbar lordosis with " functional scoliosis with right-sided concavity.  This scoliosis corrected with  bending of the lumbar spine.     Straight leg raising increased the pain in the back and down the lateral aspect of the right knee onto the lateral leg to the lateral malleolus and foot.     There is decreased sensory to light touch on the lateral aspect of the thigh and around the right knee going down to the lateral aspect of the leg to the right lateral malleolus into the dorsum of the foot..    Deep tendon reflexes is present for the patellar tendons bilaterally.  Achilles reflexes are present bilaterally and symmetric.    Medial Hamstrings reflex is decreased on the right compared to the left side.  Plantar cutaneous are downgoing.  Ankle dorsiflexion is 5/5 bilaterally.  Plantar flexion of the ankles are 5/5 bilaterally.  Big toe extension is 4/5 on the right compared to 5/5 on the left side.    Negative Tinel's sign over the right peroneal nerve at the fibular neck.  Ledy sign for axial loading and global rotation are negative.  No aberrant pain behavior.       Hematoma over R arm and limited ROM of the shoulder     Diagnosis  Problem List Items Addressed This Visit       Left lumbar radiculopathy    Lumbar herniated disc    Relevant Medications    oxyCODONE-acetaminophen (Percocet)  mg tablet (Start on 7/10/2024)    oxyCODONE-acetaminophen (Percocet)  mg tablet (Start on 8/7/2024)    gabapentin (Neurontin) 300 mg capsule    Degeneration of intervertebral disc of cervical region    Relevant Medications    oxyCODONE-acetaminophen (Percocet)  mg tablet (Start on 7/10/2024)    oxyCODONE-acetaminophen (Percocet)  mg tablet (Start on 8/7/2024)    gabapentin (Neurontin) 300 mg capsule    Degeneration of intervertebral disc of lumbar region    Relevant Medications    oxyCODONE-acetaminophen (Percocet)  mg tablet (Start on 7/10/2024)    oxyCODONE-acetaminophen (Percocet)  mg tablet (Start on  8/7/2024)    gabapentin (Neurontin) 300 mg capsule    Sciatica    Relevant Medications    oxyCODONE-acetaminophen (Percocet)  mg tablet (Start on 7/10/2024)    oxyCODONE-acetaminophen (Percocet)  mg tablet (Start on 8/7/2024)    gabapentin (Neurontin) 300 mg capsule    Cervical radiculitis    Annular tear of lumbar disc - Primary    Relevant Medications    oxyCODONE-acetaminophen (Percocet)  mg tablet (Start on 7/10/2024)    oxyCODONE-acetaminophen (Percocet)  mg tablet (Start on 8/7/2024)    gabapentin (Neurontin) 300 mg capsule     Other Visit Diagnoses       Long term current use of opiate analgesic        Relevant Orders    Opiate/Opioid/Benzo Prescription Compliance             Plan  Reviewed the pain generators.  Went over the types of pain with neuropathic and nociceptive and different pathologies and therapeutic modalities. Discussed the mechanism of action of interventions from acupuncture, physical therapy , regular exercises, injections, botox, spinal cord stimulation, and role of surgery     Went over pathology of the intervertebral disc displacement and the anatomical relation to the Nerve roots and relation to the radicular symptoms. Went over treatment modalities with conservative treatment including acupuncture   and epidural steroid injection with fluoroscopy guidance and last resort of surgery    Based on the above findings and the clinical response to the opioids medications and improvement of the activities of daily living, sleep, and work performance. We made this complex decision to continue the opioids therapy in light of the evidence of the patient's responsibility in using the pain medications as prescribed for the nonmalignant chronic pain condition. We discussed about the use of the pain medications to treat the symptoms of chronic nonmalignant pain and we are not trying the repair the permanent damage in the tissues, rather we are trying to control the symptoms  induced by the permanent damage to the tissues inducing the chronic pain condition and resulting disability. I explained the difference and discussed it with the patient and stressed the importance of knowing the difference especially because of the potential side effects and the potential addicting effect and habit forming nature of the dangerous drugs we are using to treat the symptoms of the chronic pain.      We discussed that we are prescribing the medications on good laura and legitimate medical reason.     We reviewed the side effects and precautions of opioids prescriptions as discussed in the opioids treatment agreement.    realizes the interaction between the therapeutic classes including the respiratory depression and potential death     Random drug testing   we will submit     Continue with pain meds  Dw her about r shoulder and ruptured long head of biceps as clinically clear and miguel deformity  Dw her about icing the area and conservative treatment    Discussed about NSAIDS and I explained about the opioids sparing effect to allow keeping the opioids dose at minimal effective dose.   I went over the potential side effects of the NSAIDS on the gastrointestinal, renal and cardiovascular systems.      I detailed the side effects from the acetaminophen in the medication and made aware of those. I also explained about the cumulative effects on the organs and mainly the liver.     Given the opioids therapy , we discussed about the risk for accidental over dose on the pain medications, either for patient or other household. I went over the mechanism of action and mode of use of the Naloxone according to the  recommendations. I will provide a prescription for a kit.     Follow-up 8 weeks or earlier if needed     The level of clinical decision making in this office visit,  is high, given the high risks of complications with the morbidity and mortality due to the fact that acute and chronic pain  may pose a threat to life and bodily function, if under treated, poorly treated, or with failure to maintain adequate treatment and timely medical follow up. Additionally over treatment has its own set of complications including overdosing on the pain medications and also the habit forming potentials with the use of the medications used to treat chronic painful conditions including therapeutic classes classified as dangerous medications. Given the serious and fluctuating nature of pain level and instensity with extensive consideration for whenever pain changes, there is always the risk of prolonged functional impairment requiring close patient monitoring with regular assessments and reassessments and high level medical decision making at every office visit. The amount and complexity of data reviewed is high given the patient clinical presentation, labs,  data, radiology reports, and other tests as discussed during office visits. Pertinent data whether positive or negative were taken in consideration in the process of making this high level medical decision.

## 2024-07-10 ENCOUNTER — TELEPHONE (OUTPATIENT)
Dept: PRIMARY CARE | Facility: CLINIC | Age: 74
End: 2024-07-10

## 2024-07-10 DIAGNOSIS — I10 BENIGN ESSENTIAL HYPERTENSION: ICD-10-CM

## 2024-07-10 RX ORDER — PANTOPRAZOLE SODIUM 40 MG/1
40 TABLET, DELAYED RELEASE ORAL DAILY
Qty: 90 TABLET | Refills: 2 | Status: SHIPPED | OUTPATIENT
Start: 2024-07-10

## 2024-07-17 ENCOUNTER — APPOINTMENT (OUTPATIENT)
Dept: PRIMARY CARE | Facility: CLINIC | Age: 74
End: 2024-07-17
Payer: COMMERCIAL

## 2024-07-24 ENCOUNTER — APPOINTMENT (OUTPATIENT)
Dept: PRIMARY CARE | Facility: CLINIC | Age: 74
End: 2024-07-24
Payer: COMMERCIAL

## 2024-07-24 VITALS
SYSTOLIC BLOOD PRESSURE: 60 MMHG | DIASTOLIC BLOOD PRESSURE: 50 MMHG | BODY MASS INDEX: 18.25 KG/M2 | OXYGEN SATURATION: 88 % | TEMPERATURE: 97 F | HEART RATE: 64 BPM | WEIGHT: 120 LBS

## 2024-07-24 DIAGNOSIS — R42 DIZZINESS: ICD-10-CM

## 2024-07-24 DIAGNOSIS — E86.1 HYPOTENSION DUE TO HYPOVOLEMIA: Primary | ICD-10-CM

## 2024-07-24 DIAGNOSIS — I95.89 HYPOTENSION DUE TO HYPOVOLEMIA: Primary | ICD-10-CM

## 2024-07-24 PROCEDURE — 1160F RVW MEDS BY RX/DR IN RCRD: CPT | Performed by: FAMILY MEDICINE

## 2024-07-24 PROCEDURE — 1157F ADVNC CARE PLAN IN RCRD: CPT | Performed by: FAMILY MEDICINE

## 2024-07-24 PROCEDURE — 3074F SYST BP LT 130 MM HG: CPT | Performed by: FAMILY MEDICINE

## 2024-07-24 PROCEDURE — 99214 OFFICE O/P EST MOD 30 MIN: CPT | Performed by: FAMILY MEDICINE

## 2024-07-24 PROCEDURE — 3078F DIAST BP <80 MM HG: CPT | Performed by: FAMILY MEDICINE

## 2024-07-24 PROCEDURE — 1159F MED LIST DOCD IN RCRD: CPT | Performed by: FAMILY MEDICINE

## 2024-07-24 NOTE — PROGRESS NOTES
Subjective   Patient ID: Abi Weston is a 74 y.o. female who presents for Medicare Annual Wellness Visit Subsequent.    Assessment/Plan     Problem List Items Addressed This Visit       Benign essential hypertension    Chronic atrial fibrillation (Multi)     Other Visit Diagnoses       Visit for preventive health examination    -  Primary          Continue current medication  Hold Lasix  Follow-up with cardiology  Advised to stay away from smoking  Continue atorvastatin 40 mg  Follow-up in 4 months  Mammogram April 2022 within normal limits  Scan showed osteopenia in April 2022  Declined flu vaccine      Up-to-date with pneumonia vaccine  New shingles vaccine patient declined for now  Colonoscopy January 2019 and repeat in 5 years hyperplastic polyps  Follow-up in 6 months  Come back early with any new sign and symptoms patient understands and agreement plan.     HPI    74-year-old female here    Complaining of feeling dizzy lightheadedness had episode of nausea vomiting diarrhea few days ago still had a diarrhea this morning  Patient is hypotensive  Recheck blood pressure is 50/30    Will advise patient to go to the ER  ER informed    Shoulder osteoarthritis recently received steroid injection      Previous visit    Patient is taking lisinopril 10 mg with chlorthalidone  Patient still not sure will call us back  Needs lab work today  Discussed about RSV vaccine  Still on and off smoking    Otherwise not fluid overload not short of breath  Using o2 only at night  Will be going for left foot sx    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 Rossi  Status post lumbar spinal fusion and decompression - was seen by neurosurgery Dr. Ramos - history of spinal surgery Ã--2 -  following up with pain management  Chronic pain syndrome - lower back pain - following up with pain management  Osteoarthritis  Status post right carpal tunnel surgery feeling better  GERD  Diastolic CHF     Started smoking again advised patient to stop smoking  Following up with cardiology and pain management  Still smoking       Complaining of weight loss we will consider low-dose of Remeron 7.5 mg risk-benefit discussed  Advance directives:. Advanced Care Planning discussed and documented advance care plan or surrogate decision maker documented in the medical record.   A Conversation about Advance directives of at least 16 minutes has occurred. Actual time spent: I spent >16 minutes discussing Advance Care Planning including the explanation and discussion of advance directives. If patient does not have current up to date documents, examples and information provided on how to create both Living Will and Power of . Patient was encouraged to work on completing these documents.  Conversation with: The conversation with the patient and/or participants was voluntary.  Documents discussed and/or completed: Living Will was discussed with participants. Healthcare POA was discussed with participants. Patient educated on how to obtain Living Will and Power of . Discussed patient end of life choices.   Patient has living will and patient's daughter liudmila is healthcare power of .  Patient is full code.      Allergies   Allergen Reactions    Cefazolin Hives     Burn hives    red blotches       Current Outpatient Medications   Medication Sig Dispense Refill    albuterol 90 mcg/actuation inhaler Inhale 2 puffs 2 times a day. 18 g 2    apixaban (Eliquis) 5 mg tablet Take 1 tablet (5 mg) by mouth 2 times a day. 180 tablet 2    atorvastatin (Lipitor) 40 mg tablet TAKE 1 TABLET DAILY 90 tablet 2    biotin 10 mg tablet Take by mouth.      chlorthalidone (Hygroton) 25 mg tablet TAKE 1 TABLET DAILY 90 tablet 2     cholecalciferol (Vitamin D-3) 50 mcg (2,000 unit) capsule Take by mouth.      escitalopram (Lexapro) 20 mg tablet Take 1 tablet (20 mg) by mouth once daily. 90 tablet 2    gabapentin (Neurontin) 300 mg capsule Take 1 capsule (300 mg) by mouth 3 times a day. 90 capsule 1    hydrocortisone-acetic acid (Vosol-HC) otic solution Administer 3 drops into each ear 3 times a day as needed (itching).      levothyroxine (Synthroid, Levoxyl) 75 mcg tablet TAKE 1 TABLET DAILY AS     DIRECTED 90 tablet 2    lisinopril 10 mg tablet Take 4 tablets (40 mg) by mouth once daily.      metoprolol succinate XL (Toprol-XL) 25 mg 24 hr tablet TAKE 1 TABLET DAILY 90 tablet 1    multivitamin with minerals (HAIR,SKIN AND NAILS ORAL) Hair skin and nails supplement      naloxone (Narcan) 4 mg/0.1 mL nasal spray Administer into affected nostril(s).      [START ON 8/7/2024] oxyCODONE-acetaminophen (Percocet)  mg tablet Take 1 tablet by mouth every 6 hours if needed for severe pain (7 - 10) for up to 28 days. Do not fill before August 7, 2024. 112 tablet 0    pantoprazole (ProtoNix) 40 mg EC tablet TAKE 1 TABLET DAILY 90 tablet 2    levothyroxine (Synthroid, Levoxyl) 100 mcg tablet Take 1 tablet (100 mcg) by mouth once daily.      oxyCODONE-acetaminophen (Percocet)  mg tablet Take 1 tablet by mouth every 6 hours if needed for severe pain (7 - 10) for up to 28 days. Do not fill before July 10, 2024. 112 tablet 0     No current facility-administered medications for this visit.       Objective   Visit Vitals  BP (!) 60/50 (BP Location: Right arm, Patient Position: Sitting)   Temp 36.1 °C (97 °F)   Wt 54.4 kg (120 lb)   SpO2 (!) 88%   BMI 18.25 kg/m²   Smoking Status Every Day   BSA 1.62 m²     Physical Exam  Constitutional:       General: He is not in acute distress.     Appearance: Normal appearance.   HENT:      Head: Normocephalic and atraumatic.      Nose: Nose normal.   Eyes:      Extraocular Movements: Extraocular movements  intact.      Conjunctiva/sclera: Conjunctivae normal.   Cardiovascular:      Rate and Rhythm: Normal rate and regular rhythm.   Pulmonary:      Effort: Pulmonary effort is normal.      Breath sounds: Normal breath sounds.   Skin:     General: Skin is warm.   Neurological:      Mental Status: He is alert and oriented to person, place, and time.   Psychiatric:         Mood and Affect: Mood normal.         Behavior: Behavior normal.   Immunization History   Administered Date(s) Administered    Moderna COVID-19 vaccine, bivalent, blue cap/gray label *Check age/dose* 12/20/2022    Pneumococcal conjugate vaccine, 13-valent (PREVNAR 13) 09/04/2019    Pneumococcal polysaccharide vaccine, 23-valent, age 2 years and older (PNEUMOVAX 23) 08/29/2014, 02/16/2021    Td vaccine, age 7 years and older (TDVAX) 10/26/2011    Td vaccine, age 7 years and older (TENIVAC) 03/11/2016       Review of Systems    No visits with results within 4 Month(s) from this visit.   Latest known visit with results is:   Legacy Encounter on 01/19/2023   Component Date Value Ref Range Status    DRUG SCREEN COMMENT URINE 01/19/2023 SEE BELOW   Final    Creatine, Urine 01/19/2023 125.5  mg/dL Final    Amphetamine Screen, Urine 01/19/2023 PRESUMPTIVE NEGATIVE  NEGATIVE Final    Barbiturate Screen, Urine 01/19/2023 PRESUMPTIVE NEGATIVE  NEGATIVE Final    Cannabinoid Screen, Urine 01/19/2023 PRESUMPTIVE NEGATIVE  NEGATIVE Final    Cocaine Screen, Urine 01/19/2023 PRESUMPTIVE NEGATIVE  NEGATIVE Final    PCP Screen, Urine 01/19/2023 PRESUMPTIVE NEGATIVE  NEGATIVE Final    7-Aminoclonazepam 01/19/2023 <25  Cutoff <25 ng/mL Final    Alpha-Hydroxyalprazolam 01/19/2023 <25  Cutoff <25 ng/mL Final    Alpha-Hydroxymidazolam 01/19/2023 <25  Cutoff <25 ng/mL Final    Alprazolam 01/19/2023 <25  Cutoff <25 ng/mL Final    Chlordiazepoxide 01/19/2023 <25  Cutoff <25 ng/mL Final    Clonazepam 01/19/2023 <25  Cutoff <25 ng/mL Final    Diazepam 01/19/2023 <25  Cutoff <25  ng/mL Final    Lorazepam 01/19/2023 <25  Cutoff <25 ng/mL Final    Midazolam 01/19/2023 <25  Cutoff <25 ng/mL Final    Nordiazepam 01/19/2023 <25  Cutoff <25 ng/mL Final    Oxazepam 01/19/2023 <25  Cutoff <25 ng/mL Final    Temazepam 01/19/2023 <25  Cutoff <25 ng/mL Final    Zolpidem 01/19/2023 <25  Cutoff <25 ng/mL Final    Zolpidem Metabolite (ZCA) 01/19/2023 <25  Cutoff <25 ng/mL Final    6-Acetylmorphine 01/19/2023 <25  Cutoff <25 ng/mL Final    Codeine 01/19/2023 <50  Cutoff <50 ng/mL Final    Hydrocodone 01/19/2023 <25  Cutoff <25 ng/mL Final    Hydromorphone 01/19/2023 <25  Cutoff <25 ng/mL Final    Morphine Urine 01/19/2023 <50  Cutoff <50 ng/mL Final    Norhydrocodone 01/19/2023 <25  Cutoff <25 ng/mL Final    Noroxycodone 01/19/2023 >1000 (A)  Cutoff <25 ng/mL Final    Oxycodone 01/19/2023 1,262 (A)  Cutoff <25 ng/mL Final    Oxymorphone 01/19/2023 31 (A)  Cutoff <25 ng/mL Final    Tramadol 01/19/2023 <50  Cutoff <50 ng/mL Final    O-Desmethyltramadol 01/19/2023 <50  Cutoff <50 ng/mL Final    Fentanyl 01/19/2023 <2.5  Cutoff<2.5 ng/mL Final    Norfentanyl 01/19/2023 <2.5  Cutoff<2.5 ng/mL Final    METHADONE CONFIRMATION,URINE 01/19/2023 <25  Cutoff <25 ng/mL Final    EDDP 01/19/2023 <25  Cutoff <25 ng/mL Final       Radiology: Reviewed imaging in powerchart.  No results found.    Family History   Problem Relation Name Age of Onset    Diabetes Mother      Hypertension Mother      Stroke Father       Social History     Socioeconomic History    Marital status:    Tobacco Use    Smoking status: Every Day     Current packs/day: 0.50     Average packs/day: 0.5 packs/day for 30.0 years (15.0 ttl pk-yrs)     Types: Cigarettes    Smokeless tobacco: Never    Tobacco comments:     I will sneak a couple here and there   Substance and Sexual Activity    Alcohol use: Never    Drug use: Yes     Types: Codeine     Past Medical History:   Diagnosis Date    Personal history of other diseases of the circulatory system      History of hypertension     Past Surgical History:   Procedure Laterality Date    BACK SURGERY  01/23/2018    Back Surgery    COLON SURGERY  07/09/2014    Colon Surgery    OTHER SURGICAL HISTORY  02/11/2019    Endoscopy       Charting was completed using voice recognition technology and may include unintended errors.

## 2024-08-02 ENCOUNTER — TELEPHONE (OUTPATIENT)
Dept: PRIMARY CARE | Facility: CLINIC | Age: 74
End: 2024-08-02
Payer: COMMERCIAL

## 2024-08-02 ENCOUNTER — TELEPHONE (OUTPATIENT)
Dept: PRIMARY CARE | Facility: CLINIC | Age: 74
End: 2024-08-02

## 2024-08-07 ENCOUNTER — APPOINTMENT (OUTPATIENT)
Dept: PRIMARY CARE | Facility: CLINIC | Age: 74
End: 2024-08-07
Payer: COMMERCIAL

## 2024-08-07 VITALS
WEIGHT: 120 LBS | OXYGEN SATURATION: 95 % | BODY MASS INDEX: 18.19 KG/M2 | SYSTOLIC BLOOD PRESSURE: 60 MMHG | DIASTOLIC BLOOD PRESSURE: 40 MMHG | HEART RATE: 96 BPM | HEIGHT: 68 IN | TEMPERATURE: 97 F

## 2024-08-07 DIAGNOSIS — R19.7 ACUTE DIARRHEA: Primary | ICD-10-CM

## 2024-08-07 DIAGNOSIS — E86.1 HYPOTENSION DUE TO HYPOVOLEMIA: ICD-10-CM

## 2024-08-07 DIAGNOSIS — I95.89 HYPOTENSION DUE TO HYPOVOLEMIA: ICD-10-CM

## 2024-08-07 PROCEDURE — 1157F ADVNC CARE PLAN IN RCRD: CPT | Performed by: FAMILY MEDICINE

## 2024-08-07 PROCEDURE — 3078F DIAST BP <80 MM HG: CPT | Performed by: FAMILY MEDICINE

## 2024-08-07 PROCEDURE — 3074F SYST BP LT 130 MM HG: CPT | Performed by: FAMILY MEDICINE

## 2024-08-07 PROCEDURE — 1159F MED LIST DOCD IN RCRD: CPT | Performed by: FAMILY MEDICINE

## 2024-08-07 PROCEDURE — 99214 OFFICE O/P EST MOD 30 MIN: CPT | Performed by: FAMILY MEDICINE

## 2024-08-07 RX ORDER — METRONIDAZOLE 500 MG/1
500 TABLET, FILM COATED ORAL
COMMUNITY
Start: 2024-08-01 | End: 2024-08-07

## 2024-08-07 RX ORDER — AMLODIPINE BESYLATE 5 MG/1
5 TABLET ORAL
COMMUNITY
Start: 2024-08-01

## 2024-08-07 NOTE — PROGRESS NOTES
Subjective   Patient ID: Abi Weston is a 74 y.o. female who presents for Hospital Follow-up.    Assessment/Plan     Problem List Items Addressed This Visit    None      HPI    74-year-old female following up today from recent hospitalization with gram-negative bacteremia possible pneumonia  Was discharged on antibiotic  Today complaining of diarrhea since hospital discharge  Patient is hypertensive complaining of lightheadedness dizziness with blood pressure 62/40    Will advised patient to go to ER  ER informed    Allergies   Allergen Reactions    Cefazolin Hives     Burn hives    red blotches       Current Outpatient Medications   Medication Sig Dispense Refill    albuterol 90 mcg/actuation inhaler Inhale 2 puffs 2 times a day. 18 g 2    apixaban (Eliquis) 5 mg tablet Take 1 tablet (5 mg) by mouth 2 times a day. 180 tablet 2    atorvastatin (Lipitor) 40 mg tablet TAKE 1 TABLET DAILY 90 tablet 2    biotin 10 mg tablet Take by mouth.      chlorthalidone (Hygroton) 25 mg tablet TAKE 1 TABLET DAILY 90 tablet 2    cholecalciferol (Vitamin D-3) 50 mcg (2,000 unit) capsule Take by mouth.      escitalopram (Lexapro) 20 mg tablet Take 1 tablet (20 mg) by mouth once daily. 90 tablet 2    FlagyL 500 mg tablet 1 tablet (500 mg).      gabapentin (Neurontin) 300 mg capsule Take 1 capsule (300 mg) by mouth 3 times a day. 90 capsule 1    levothyroxine (Synthroid, Levoxyl) 75 mcg tablet TAKE 1 TABLET DAILY AS     DIRECTED 90 tablet 2    lisinopril 10 mg tablet Take 4 tablets (40 mg) by mouth once daily.      metoprolol succinate XL (Toprol-XL) 25 mg 24 hr tablet TAKE 1 TABLET DAILY 90 tablet 1    multivitamin with minerals (HAIR,SKIN AND NAILS ORAL) Hair skin and nails supplement      naloxone (Narcan) 4 mg/0.1 mL nasal spray Administer into affected nostril(s).      Norvasc 5 mg tablet 1 tablet (5 mg).      oxyCODONE-acetaminophen (Percocet)  mg tablet Take 1 tablet by mouth every 6 hours if needed for severe pain (7 -  "10) for up to 28 days. Do not fill before July 10, 2024. 112 tablet 0    oxyCODONE-acetaminophen (Percocet)  mg tablet Take 1 tablet by mouth every 6 hours if needed for severe pain (7 - 10) for up to 28 days. Do not fill before August 7, 2024. 112 tablet 0    pantoprazole (ProtoNix) 40 mg EC tablet TAKE 1 TABLET DAILY 90 tablet 2    levothyroxine (Synthroid, Levoxyl) 100 mcg tablet Take 1 tablet (100 mcg) by mouth once daily.       No current facility-administered medications for this visit.       Objective   Visit Vitals  BP (!) 60/40 (BP Location: Left arm, Patient Position: Sitting)   Pulse 96   Temp 36.1 °C (97 °F)   Ht 1.727 m (5' 8\")   Wt 54.4 kg (120 lb)   SpO2 95%   BMI 18.25 kg/m²   OB Status Unknown   Smoking Status Every Day   BSA 1.62 m²     Physical Exam  Constitutional:       General: He is not in acute distress.     Appearance: Normal appearance.   HENT:      Head: Normocephalic and atraumatic.      Nose: Nose normal.   Eyes:      Extraocular Movements: Extraocular movements intact.      Conjunctiva/sclera: Conjunctivae normal.   Cardiovascular:      Rate and Rhythm: Normal rate and regular rhythm.   Pulmonary:      Effort: Pulmonary effort is normal.      Breath sounds: Normal breath sounds.   Skin:     General: Skin is warm.   Neurological:      Mental Status: He is alert and oriented to person, place, and time.   Psychiatric:         Mood and Affect: Mood normal.         Behavior: Behavior normal.   Immunization History   Administered Date(s) Administered    Moderna COVID-19 vaccine, bivalent, blue cap/gray label *Check age/dose* 12/20/2022    Pneumococcal conjugate vaccine, 13-valent (PREVNAR 13) 09/04/2019    Pneumococcal polysaccharide vaccine, 23-valent, age 2 years and older (PNEUMOVAX 23) 08/29/2014, 02/16/2021    Td vaccine, age 7 years and older (TDVAX) 10/26/2011    Td vaccine, age 7 years and older (TENIVAC) 03/11/2016       Review of Systems    No visits with results within 4 " Month(s) from this visit.   Latest known visit with results is:   Legacy Encounter on 01/19/2023   Component Date Value Ref Range Status    DRUG SCREEN COMMENT URINE 01/19/2023 SEE BELOW   Final    Creatine, Urine 01/19/2023 125.5  mg/dL Final    Amphetamine Screen, Urine 01/19/2023 PRESUMPTIVE NEGATIVE  NEGATIVE Final    Barbiturate Screen, Urine 01/19/2023 PRESUMPTIVE NEGATIVE  NEGATIVE Final    Cannabinoid Screen, Urine 01/19/2023 PRESUMPTIVE NEGATIVE  NEGATIVE Final    Cocaine Screen, Urine 01/19/2023 PRESUMPTIVE NEGATIVE  NEGATIVE Final    PCP Screen, Urine 01/19/2023 PRESUMPTIVE NEGATIVE  NEGATIVE Final    7-Aminoclonazepam 01/19/2023 <25  Cutoff <25 ng/mL Final    Alpha-Hydroxyalprazolam 01/19/2023 <25  Cutoff <25 ng/mL Final    Alpha-Hydroxymidazolam 01/19/2023 <25  Cutoff <25 ng/mL Final    Alprazolam 01/19/2023 <25  Cutoff <25 ng/mL Final    Chlordiazepoxide 01/19/2023 <25  Cutoff <25 ng/mL Final    Clonazepam 01/19/2023 <25  Cutoff <25 ng/mL Final    Diazepam 01/19/2023 <25  Cutoff <25 ng/mL Final    Lorazepam 01/19/2023 <25  Cutoff <25 ng/mL Final    Midazolam 01/19/2023 <25  Cutoff <25 ng/mL Final    Nordiazepam 01/19/2023 <25  Cutoff <25 ng/mL Final    Oxazepam 01/19/2023 <25  Cutoff <25 ng/mL Final    Temazepam 01/19/2023 <25  Cutoff <25 ng/mL Final    Zolpidem 01/19/2023 <25  Cutoff <25 ng/mL Final    Zolpidem Metabolite (ZCA) 01/19/2023 <25  Cutoff <25 ng/mL Final    6-Acetylmorphine 01/19/2023 <25  Cutoff <25 ng/mL Final    Codeine 01/19/2023 <50  Cutoff <50 ng/mL Final    Hydrocodone 01/19/2023 <25  Cutoff <25 ng/mL Final    Hydromorphone 01/19/2023 <25  Cutoff <25 ng/mL Final    Morphine Urine 01/19/2023 <50  Cutoff <50 ng/mL Final    Norhydrocodone 01/19/2023 <25  Cutoff <25 ng/mL Final    Noroxycodone 01/19/2023 >1000 (A)  Cutoff <25 ng/mL Final    Oxycodone 01/19/2023 1,262 (A)  Cutoff <25 ng/mL Final    Oxymorphone 01/19/2023 31 (A)  Cutoff <25 ng/mL Final    Tramadol 01/19/2023 <50  Cutoff  <50 ng/mL Final    O-Desmethyltramadol 01/19/2023 <50  Cutoff <50 ng/mL Final    Fentanyl 01/19/2023 <2.5  Cutoff<2.5 ng/mL Final    Norfentanyl 01/19/2023 <2.5  Cutoff<2.5 ng/mL Final    METHADONE CONFIRMATION,URINE 01/19/2023 <25  Cutoff <25 ng/mL Final    EDDP 01/19/2023 <25  Cutoff <25 ng/mL Final       Radiology: Reviewed imaging in powerchart.  XR shoulder right 2+ views    Result Date: 7/17/2024  Imaging Result:  Four views of the right shoulder reviewed demonstrating moderate to advanced degenerative changes of the glenohumeral joint and the acromioclavicular joint with decreased acromial humeral interval.  There was no evidence of acute fracture, dislocation or other osseous abnormality.    XR shoulder right 2+ views    Result Date: 7/9/2024  EXAMINATION: XR SHOULDER RIGHT MINIMUM 2 VIEWSPRO/RT   07/09/2024 12:18 PM CLINICAL HISTORY: Shoulder pain ASSOCIATED DIAGNOSIS: Right shoulder pain, unspecified chronicity ORDERING PROVIDER: [employer] NOTE: COMPARISON: None IMPRESSION: No right shoulder fracture or glenohumeral dislocation is identified. There are no abnormal periarticular calcifications.  Mild degenerative changes are present at the acromioclavicular joint.  Mild to moderate degenerative changes in the glenohumeral joint without definite joint space loss. Right shoulder MACRO: None      Family History   Problem Relation Name Age of Onset    Diabetes Mother      Hypertension Mother      Stroke Father       Social History     Socioeconomic History    Marital status:    Tobacco Use    Smoking status: Every Day     Current packs/day: 0.50     Average packs/day: 0.5 packs/day for 30.0 years (15.0 ttl pk-yrs)     Types: Cigarettes    Smokeless tobacco: Never    Tobacco comments:     I will sneak a couple here and there   Substance and Sexual Activity    Alcohol use: Never    Drug use: Yes     Types: Codeine     Past Medical History:   Diagnosis Date    Personal history of other  diseases of the circulatory system     History of hypertension     Past Surgical History:   Procedure Laterality Date    BACK SURGERY  01/23/2018    Back Surgery    COLON SURGERY  07/09/2014    Colon Surgery    OTHER SURGICAL HISTORY  02/11/2019    Endoscopy       Charting was completed using voice recognition technology and may include unintended errors.

## 2024-09-03 ENCOUNTER — APPOINTMENT (OUTPATIENT)
Dept: PAIN MEDICINE | Facility: CLINIC | Age: 74
End: 2024-09-03
Payer: COMMERCIAL

## 2024-09-03 DIAGNOSIS — M54.16 LEFT LUMBAR RADICULOPATHY: ICD-10-CM

## 2024-09-03 DIAGNOSIS — M54.31 BILATERAL SCIATICA: ICD-10-CM

## 2024-09-03 DIAGNOSIS — M54.32 BILATERAL SCIATICA: ICD-10-CM

## 2024-09-03 DIAGNOSIS — M51.36 ANNULAR TEAR OF LUMBAR DISC: Primary | ICD-10-CM

## 2024-09-03 DIAGNOSIS — M51.36 DEGENERATION OF INTERVERTEBRAL DISC OF LUMBAR REGION: ICD-10-CM

## 2024-09-03 DIAGNOSIS — M51.26 LUMBAR HERNIATED DISC: ICD-10-CM

## 2024-09-03 DIAGNOSIS — M50.30 DEGENERATION OF INTERVERTEBRAL DISC OF CERVICAL REGION: ICD-10-CM

## 2024-09-03 PROCEDURE — 1157F ADVNC CARE PLAN IN RCRD: CPT | Performed by: PHYSICAL MEDICINE & REHABILITATION

## 2024-09-03 PROCEDURE — 99214 OFFICE O/P EST MOD 30 MIN: CPT | Performed by: PHYSICAL MEDICINE & REHABILITATION

## 2024-09-03 RX ORDER — OXYCODONE AND ACETAMINOPHEN 10; 325 MG/1; MG/1
1 TABLET ORAL EVERY 6 HOURS PRN
Qty: 112 TABLET | Refills: 0 | Status: SHIPPED | OUTPATIENT
Start: 2024-10-04 | End: 2024-11-01

## 2024-09-03 RX ORDER — GABAPENTIN 300 MG/1
300 CAPSULE ORAL 3 TIMES DAILY
Qty: 90 CAPSULE | Refills: 1 | Status: SHIPPED | OUTPATIENT
Start: 2024-09-03 | End: 2024-10-03

## 2024-09-03 RX ORDER — OXYCODONE AND ACETAMINOPHEN 10; 325 MG/1; MG/1
1 TABLET ORAL EVERY 6 HOURS PRN
Qty: 112 TABLET | Refills: 0 | Status: SHIPPED | OUTPATIENT
Start: 2024-09-06 | End: 2024-10-04

## 2024-09-03 NOTE — PROGRESS NOTES
Chief complaint  Back and lower limbs pain     History  Abi Weston is back for pain management office visit  Continues with pain in the back and leg  Worse on the left  The pain in the back is deep achy worse in the mid back area.  This is associated with tight muscle bands.  This limits the range of motion of the lumbar spine mainly in forward flexion.  The pain in the back is radiating around the side on the lateral aspect of the left thigh going down toward the lateral aspect of the left leg into the lateral malleolus toward the lateral aspect of the foot towards the left big toe.    This pain down to the left lower limb is more of a burning tingling sensation.  The pain in the left lower limb worsens with bending forward or with any lifting, it improves with laying on the side and resting.  This is similar to the associated pain in the middle to lower back.  Denied any bowel or bladder incontinence.  With the worst pain there is tendency to catch the toe especially on carpeted area.  This occurs mostly when tired and toward the end of the day.    The skin is intact with no breakdown.  No vesicles.     realizes the interaction between the therapeutic classes including the respiratory depression and potential death       Pain level without medication is 8/10 , with the medication pain level 3 to 4 /10.     The pain meds are helping control the pain and improving Activities of Daily living and quality of life and quality of sleep.    opioids treatment agreement jan 2024  Pill count today, using count tray, and in front of patient :  19    pills , last fill was on 8/9  for 112 tabs,  the count is correct  Oarrs pulled and reviewed, no concerns  last urine toxicology testing earlier this year and it was compliant we will repeat  Xray updated spine   ORT Score is  0  Pain pathology and pain generators spine   Modalities tried injection, surgery, physical therapy, TENS unit, nonsteroidal anti-inflammatory  "medication       Review of Systems :  Denied any fever or chills. No weight loss and no night sweats. No cough or sputum production. No diarrhea   The constipation has been responding to fibers and over the counter medications.     No bladder and bowel incontinence and no other changes in bladder and bowel. No skin changes.  Reports tiredness and fatigability only if the pain is not controlled.     Denied opioids diversion and abuse and denies alcoholism. Denies overuse of the pain medications.  No reported euphoria sensation or getting a \"high\" on the pain medications.    The control of the pain with the pain medications is helping the control of the symptoms and allowing the function and activities of daily living, enjoyment of life, improving the quality of life and sleep with less interruption by the pain. The goal is symptomatic control of the nonmalignant chronic pain and not to repair the permanent damage in the tissues inducing the chronic pain conditions. We are aiming to shift the focus from the nonmalignant chronic pain to other aspects of life by symptomatically treating this chronic pain. If this pain is not treated it will lead to major morbidity and it is also associated with increased risks of mortality. The patient understands those very clearly and also understand high risks of morbidity and mortality if not strictly adherent to the treatment recommendations and reporting any associated side effects. Also patient understand the full responsibility associated with these medications to avoid abuse or overuse or any use of these medications for anything besides treating the patient's own chronic pain and nothing else under any circumstances.        Physical examination  Awake, alert and oriented for time place and persons   declined Chaperone for the visit and was adequately  draped for the exam.    Examination of the lumbar spine showed tight muscle bands in the mid and lower back area, this is more " pronounced on the left compared to the right.  Additionally, this is inducing mild reversal of the lumbar lordosis with functional scoliosis with left-sided concavity.  This scoliosis corrects with  bending of the lumbar spine.     Straight leg raising increased the pain in the back and down the lateral aspect of the left knee onto the lateral leg to the left lateral malleolus and foot.     There is decreased sensory to light touch on the lateral aspect of the thigh and around the left knee going down to the lateral aspect of the leg to the left lateral malleolus into the dorsum of the left foot.   Deep tendon reflexes is present for the patellar tendons bilaterally.  Achilles reflexes are present bilaterally and symmetric.    Medial Hamstrings reflex is decreased on the left compared to the right side.  Plantar cutaneous are down going bilaterally.  Ankle extension are  5/5 bilaterally. Plantar flexion of the ankles are 5/5 bilaterally.  Big toe extension is 4/5 on the left compared to 5/5 on the right side.    Negative Tinel's sign over the left peroneal nerve at the fibular neck.  Ledy sign for axial loading and global rotation are negative.  No aberrant pain behavior.      Diagnosis  Problem List Items Addressed This Visit       Left lumbar radiculopathy    Lumbar herniated disc    Relevant Medications    gabapentin (Neurontin) 300 mg capsule    oxyCODONE-acetaminophen (Percocet)  mg tablet (Start on 9/6/2024)    oxyCODONE-acetaminophen (Percocet)  mg tablet (Start on 10/4/2024)    Degeneration of intervertebral disc of cervical region    Relevant Medications    gabapentin (Neurontin) 300 mg capsule    oxyCODONE-acetaminophen (Percocet)  mg tablet (Start on 9/6/2024)    oxyCODONE-acetaminophen (Percocet)  mg tablet (Start on 10/4/2024)    Degeneration of intervertebral disc of lumbar region    Relevant Medications    gabapentin (Neurontin) 300 mg capsule    oxyCODONE-acetaminophen  (Percocet)  mg tablet (Start on 9/6/2024)    oxyCODONE-acetaminophen (Percocet)  mg tablet (Start on 10/4/2024)    Sciatica    Relevant Medications    gabapentin (Neurontin) 300 mg capsule    oxyCODONE-acetaminophen (Percocet)  mg tablet (Start on 9/6/2024)    oxyCODONE-acetaminophen (Percocet)  mg tablet (Start on 10/4/2024)    Annular tear of lumbar disc - Primary    Relevant Medications    gabapentin (Neurontin) 300 mg capsule    oxyCODONE-acetaminophen (Percocet)  mg tablet (Start on 9/6/2024)    oxyCODONE-acetaminophen (Percocet)  mg tablet (Start on 10/4/2024)        Plan  Reviewed the pain generators.  Went over the types of pain with neuropathic and nociceptive and different pathologies and therapeutic modalities. Discussed the mechanism of action of interventions from acupuncture, physical therapy , regular exercises, injections, botox, spinal cord stimulation, and role of surgery     Went over pathology of the intervertebral disc displacement and the anatomical relation to the Nerve roots and relation to the radicular symptoms. Went over treatment modalities with conservative treatment including acupuncture   and epidural steroid injection with fluoroscopy guidance and last resort of surgery    Based on the above findings and the clinical response to the opioids medications and improvement of the activities of daily living, sleep, and work performance. We made this complex decision to continue the opioids therapy in light of the evidence of the patient's responsibility in using the pain medications as prescribed for the nonmalignant chronic pain condition. We discussed about the use of the pain medications to treat the symptoms of chronic nonmalignant pain and we are not trying the repair the permanent damage in the tissues, rather we are trying to control the symptoms induced by the permanent damage to the tissues inducing the chronic pain condition and resulting  disability. I explained the difference and discussed it with the patient and stressed the importance of knowing the difference especially because of the potential side effects and the potential addicting effect and habit forming nature of the dangerous drugs we are using to treat the symptoms of the chronic pain.      We discussed that we are prescribing the medications on good laura and legitimate medical reason.     We reviewed the side effects and precautions of opioids prescriptions as discussed in the opioids treatment agreement.    realizes the interaction between the therapeutic classes including the respiratory depression and potential death     Random drug testing   we will submit     Consider MRI and STEVE   Continue with pain meds       Discussed about NSAIDS and I explained about the opioids sparing effect to allow keeping the opioids dose at minimal effective dose.   I went over the potential side effects of the NSAIDS on the gastrointestinal, renal and cardiovascular systems.      I detailed the side effects from the acetaminophen in the medication and made aware of those. I also explained about the cumulative effects on the organs and mainly the liver.     Given the opioids therapy , we discussed about the risk for accidental over dose on the pain medications, either for patient or other household. I went over the mechanism of action and mode of use of the Naloxone according to the  recommendations. I will provide a prescription for a kit.     Follow-up 8 weeks or earlier if needed     The level of clinical decision making in this office visit,  is high, given the high risks of complications with the morbidity and mortality due to the fact that acute and chronic pain may pose a threat to life and bodily function, if under treated, poorly treated, or with failure to maintain adequate treatment and timely medical follow up. Additionally over treatment has its own set of complications including  overdosing on the pain medications and also the habit forming potentials with the use of the medications used to treat chronic painful conditions including therapeutic classes classified as dangerous medications. Given the serious and fluctuating nature of pain level and instensity with extensive consideration for whenever pain changes, there is always the risk of prolonged functional impairment requiring close patient monitoring with regular assessments and reassessments and high level medical decision making at every office visit. The amount and complexity of data reviewed is high given the patient clinical presentation, labs,  data, radiology reports, and other tests as discussed during office visits. Pertinent data whether positive or negative were taken in consideration in the process of making this high level medical decision.

## 2024-09-09 ENCOUNTER — APPOINTMENT (OUTPATIENT)
Dept: PRIMARY CARE | Facility: CLINIC | Age: 74
End: 2024-09-09
Payer: COMMERCIAL

## 2024-09-09 VITALS
HEIGHT: 68 IN | SYSTOLIC BLOOD PRESSURE: 119 MMHG | HEART RATE: 78 BPM | TEMPERATURE: 97.7 F | OXYGEN SATURATION: 91 % | BODY MASS INDEX: 17.73 KG/M2 | DIASTOLIC BLOOD PRESSURE: 87 MMHG | WEIGHT: 117 LBS

## 2024-09-09 DIAGNOSIS — Z00.00 VISIT FOR PREVENTIVE HEALTH EXAMINATION: ICD-10-CM

## 2024-09-09 DIAGNOSIS — I10 BENIGN ESSENTIAL HYPERTENSION: ICD-10-CM

## 2024-09-09 DIAGNOSIS — I48.20 CHRONIC ATRIAL FIBRILLATION (MULTI): ICD-10-CM

## 2024-09-09 DIAGNOSIS — E44.0 MODERATE PROTEIN MALNUTRITION (MULTI): ICD-10-CM

## 2024-09-09 DIAGNOSIS — J43.2 CENTRILOBULAR EMPHYSEMA (MULTI): ICD-10-CM

## 2024-09-09 DIAGNOSIS — Z00.00 VISIT FOR PREVENTIVE HEALTH EXAMINATION: Primary | ICD-10-CM

## 2024-09-09 PROCEDURE — 99497 ADVNCD CARE PLAN 30 MIN: CPT | Performed by: FAMILY MEDICINE

## 2024-09-09 PROCEDURE — 1159F MED LIST DOCD IN RCRD: CPT | Performed by: FAMILY MEDICINE

## 2024-09-09 PROCEDURE — 90662 IIV NO PRSV INCREASED AG IM: CPT | Performed by: FAMILY MEDICINE

## 2024-09-09 PROCEDURE — 99214 OFFICE O/P EST MOD 30 MIN: CPT | Performed by: FAMILY MEDICINE

## 2024-09-09 PROCEDURE — G0008 ADMIN INFLUENZA VIRUS VAC: HCPCS | Performed by: FAMILY MEDICINE

## 2024-09-09 PROCEDURE — 1157F ADVNC CARE PLAN IN RCRD: CPT | Performed by: FAMILY MEDICINE

## 2024-09-09 PROCEDURE — G0444 DEPRESSION SCREEN ANNUAL: HCPCS | Performed by: FAMILY MEDICINE

## 2024-09-09 PROCEDURE — 1170F FXNL STATUS ASSESSED: CPT | Performed by: FAMILY MEDICINE

## 2024-09-09 PROCEDURE — 1160F RVW MEDS BY RX/DR IN RCRD: CPT | Performed by: FAMILY MEDICINE

## 2024-09-09 PROCEDURE — 3074F SYST BP LT 130 MM HG: CPT | Performed by: FAMILY MEDICINE

## 2024-09-09 PROCEDURE — G0439 PPPS, SUBSEQ VISIT: HCPCS | Performed by: FAMILY MEDICINE

## 2024-09-09 PROCEDURE — 3079F DIAST BP 80-89 MM HG: CPT | Performed by: FAMILY MEDICINE

## 2024-09-09 RX ORDER — MIRTAZAPINE 7.5 MG/1
7.5 TABLET, FILM COATED ORAL NIGHTLY
Qty: 30 TABLET | Refills: 2 | Status: SHIPPED | OUTPATIENT
Start: 2024-09-09 | End: 2024-12-08

## 2024-09-09 RX ORDER — CHOLESTYRAMINE 4 G/9G
POWDER, FOR SUSPENSION ORAL
COMMUNITY
Start: 2024-08-12

## 2024-09-09 RX ORDER — ALBUTEROL SULFATE 90 UG/1
2 INHALANT RESPIRATORY (INHALATION) 2 TIMES DAILY
Qty: 18 G | Refills: 2 | Status: SHIPPED | OUTPATIENT
Start: 2024-09-09

## 2024-09-09 ASSESSMENT — LIFESTYLE VARIABLES
HOW OFTEN DO YOU HAVE SIX OR MORE DRINKS ON ONE OCCASION: NEVER
SKIP TO QUESTIONS 9-10: 1
HOW OFTEN DO YOU HAVE A DRINK CONTAINING ALCOHOL: NEVER
HOW MANY STANDARD DRINKS CONTAINING ALCOHOL DO YOU HAVE ON A TYPICAL DAY: PATIENT DOES NOT DRINK
AUDIT-C TOTAL SCORE: 0

## 2024-09-09 ASSESSMENT — ACTIVITIES OF DAILY LIVING (ADL)
DOING_HOUSEWORK: INDEPENDENT
GROCERY_SHOPPING: INDEPENDENT
MANAGING_FINANCES: INDEPENDENT
BATHING: INDEPENDENT
TAKING_MEDICATION: INDEPENDENT
DRESSING: INDEPENDENT

## 2024-09-09 ASSESSMENT — PATIENT HEALTH QUESTIONNAIRE - PHQ9
SUM OF ALL RESPONSES TO PHQ9 QUESTIONS 1 AND 2: 0
1. LITTLE INTEREST OR PLEASURE IN DOING THINGS: SEVERAL DAYS
SUM OF ALL RESPONSES TO PHQ9 QUESTIONS 1 AND 2: 2
1. LITTLE INTEREST OR PLEASURE IN DOING THINGS: NOT AT ALL
2. FEELING DOWN, DEPRESSED OR HOPELESS: NOT AT ALL
10. IF YOU CHECKED OFF ANY PROBLEMS, HOW DIFFICULT HAVE THESE PROBLEMS MADE IT FOR YOU TO DO YOUR WORK, TAKE CARE OF THINGS AT HOME, OR GET ALONG WITH OTHER PEOPLE: SOMEWHAT DIFFICULT
2. FEELING DOWN, DEPRESSED OR HOPELESS: SEVERAL DAYS

## 2024-09-09 NOTE — PROGRESS NOTES
Subjective   Patient ID: Abi Weston is a 74 y.o. female who presents for Medicare Annual Wellness Visit Subsequent (Refused to get flu).    Assessment/Plan     Problem List Items Addressed This Visit       Benign essential hypertension    Relevant Orders    TSH with reflex to Free T4 if abnormal    Lipid Panel    Comprehensive Metabolic Panel    CBC    Urinalysis with Reflex Microscopic    Chronic atrial fibrillation (Multi)    Relevant Orders    TSH with reflex to Free T4 if abnormal    Lipid Panel    Comprehensive Metabolic Panel    CBC    Urinalysis with Reflex Microscopic    Chronic obstructive pulmonary disease (Multi)    Relevant Medications    albuterol 90 mcg/actuation inhaler    Visit for preventive health examination - Primary    Relevant Orders    TSH with reflex to Free T4 if abnormal    Lipid Panel    Comprehensive Metabolic Panel    CBC    Urinalysis with Reflex Microscopic     Other Visit Diagnoses       Moderate protein malnutrition (Multi)        Relevant Medications    mirtazapine (Remeron) 7.5 mg tablet        Time spent around 10 minutes obtaining and discussing depression screening using PHQ 9 questions with results documented in the chart    Continue current medication    Follow-up with cardiology  Advised to stay away from smoking  Continue atorvastatin 40 mg  Follow-up in 4 months  Mammogram April 2022 within normal limits  Scan showed osteopenia in April 2022  Declined flu vaccine      Up-to-date with pneumonia vaccine  New shingles vaccine patient declined for now    Follow-up in 6 months  Come back early with any new sign and symptoms patient understands and agreement plan.     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 Rossi  Status post lumbar spinal fusion and decompression - was seen by neurosurgery Dr. Ramos - history of spinal surgery Ã--2 - following up with pain management  Chronic pain syndrome - lower back pain - following up with pain management  Osteoarthritis  Status post right carpal tunnel surgery feeling better  GERD  Diastolic CHF     Started smoking again advised patient to stop smoking  Following up with cardiology and pain management  Still smoking       Complaining of weight loss we will consider low-dose of Remeron 7.5 mg risk-benefit discussed  Advance directives:. Advanced Care Planning discussed and documented advance care plan or surrogate decision maker documented in the medical record.   A Conversation about Advance directives of at least 16 minutes has occurred. Actual time spent: I spent >16 minutes discussing Advance Care Planning including the explanation and discussion of advance directives. If patient does not have current up to date documents, examples and information provided on how to create both Living Will and Power of . Patient was encouraged to work on completing these documents.  Conversation with: The conversation with the patient and/or participants was voluntary.  Documents discussed and/or completed: Living Will was discussed with participants. Healthcare POA was discussed with participants. Patient educated on how to obtain Living Will and Power of . Discussed patient end of life choices.   Patient has  living will and patient's daughter liudmila is healthcare power of .  Patient is full code.      Allergies   Allergen Reactions    Cefazolin Hives     Burn hives    red blotches       Current Outpatient Medications   Medication Sig Dispense Refill    atorvastatin (Lipitor) 40 mg tablet TAKE 1 TABLET DAILY 90 tablet 2    biotin 10 mg tablet Take by mouth.      chlorthalidone (Hygroton) 25 mg tablet TAKE 1 TABLET DAILY 90 tablet 2    cholecalciferol (Vitamin D-3) 50 mcg (2,000 unit) capsule Take by mouth.      cholestyramine (Questran) 4 gram packet dissolve 1 packet in liquid and drink by mouth daily      escitalopram (Lexapro) 20 mg tablet Take 1 tablet (20 mg) by mouth once daily. 90 tablet 2    gabapentin (Neurontin) 300 mg capsule Take 1 capsule (300 mg) by mouth 3 times a day. 90 capsule 1    levothyroxine (Synthroid, Levoxyl) 75 mcg tablet TAKE 1 TABLET DAILY AS     DIRECTED 90 tablet 2    lisinopril 10 mg tablet Take 4 tablets (40 mg) by mouth once daily.      metoprolol succinate XL (Toprol-XL) 25 mg 24 hr tablet TAKE 1 TABLET DAILY 90 tablet 1    multivitamin with minerals (HAIR,SKIN AND NAILS ORAL) Hair skin and nails supplement      naloxone (Narcan) 4 mg/0.1 mL nasal spray Administer into affected nostril(s).      Norvasc 5 mg tablet 1 tablet (5 mg).      oxyCODONE-acetaminophen (Percocet)  mg tablet Take 1 tablet by mouth every 6 hours if needed for severe pain (7 - 10) for up to 28 days. Do not fill before September 6, 2024. 112 tablet 0    [START ON 10/4/2024] oxyCODONE-acetaminophen (Percocet)  mg tablet Take 1 tablet by mouth every 6 hours if needed for severe pain (7 - 10) for up to 28 days. Do not fill before October 4, 2024. 112 tablet 0    pantoprazole (ProtoNix) 40 mg EC tablet TAKE 1 TABLET DAILY 90 tablet 2    albuterol 90 mcg/actuation inhaler Inhale 2 puffs 2 times a day. 18 g 2    apixaban (Eliquis) 5 mg tablet Take 1 tablet (5 mg) by mouth 2 times a day. 180 tablet 2     "levothyroxine (Synthroid, Levoxyl) 100 mcg tablet Take 1 tablet (100 mcg) by mouth once daily.      mirtazapine (Remeron) 7.5 mg tablet Take 1 tablet (7.5 mg) by mouth once daily at bedtime. 30 tablet 2     No current facility-administered medications for this visit.       Objective   Visit Vitals  /87 (BP Location: Left arm, Patient Position: Sitting)   Pulse 78   Temp 36.5 °C (97.7 °F)   Ht 1.727 m (5' 8\")   Wt 53.1 kg (117 lb)   SpO2 91%   BMI 17.79 kg/m²   OB Status Unknown   Smoking Status Every Day   BSA 1.6 m²     Physical Exam  Constitutional:       General: He is not in acute distress.     Appearance: Normal appearance.   HENT:      Head: Normocephalic and atraumatic.      Nose: Nose normal.   Eyes:      Extraocular Movements: Extraocular movements intact.      Conjunctiva/sclera: Conjunctivae normal.   Cardiovascular:      Rate and Rhythm: Normal rate and regular rhythm.   Pulmonary:      Effort: Pulmonary effort is normal.      Breath sounds: Normal breath sounds.   Skin:     General: Skin is warm.   Neurological:      Mental Status: He is alert and oriented to person, place, and time.   Psychiatric:         Mood and Affect: Mood normal.         Behavior: Behavior normal.   Immunization History   Administered Date(s) Administered    Moderna COVID-19 vaccine, bivalent, blue cap/gray label *Check age/dose* 12/20/2022    Moderna SARS-CoV-2 Vaccination 03/03/2021, 03/31/2021    Pneumococcal conjugate vaccine, 13-valent (PREVNAR 13) 09/04/2019    Pneumococcal polysaccharide vaccine, 23-valent, age 2 years and older (PNEUMOVAX 23) 08/29/2014, 02/16/2021    Td vaccine, age 7 years and older (TDVAX) 10/26/2011, 03/11/2016    Td vaccine, age 7 years and older (TENIVAC) 03/11/2016    Zoster vaccine, recombinant, adult (SHINGRIX) 09/04/2019       Review of Systems    No visits with results within 4 Month(s) from this visit.   Latest known visit with results is:   Legacy Encounter on 01/19/2023   Component Date " Value Ref Range Status    DRUG SCREEN COMMENT URINE 01/19/2023 SEE BELOW   Final    Creatine, Urine 01/19/2023 125.5  mg/dL Final    Amphetamine Screen, Urine 01/19/2023 PRESUMPTIVE NEGATIVE  NEGATIVE Final    Barbiturate Screen, Urine 01/19/2023 PRESUMPTIVE NEGATIVE  NEGATIVE Final    Cannabinoid Screen, Urine 01/19/2023 PRESUMPTIVE NEGATIVE  NEGATIVE Final    Cocaine Screen, Urine 01/19/2023 PRESUMPTIVE NEGATIVE  NEGATIVE Final    PCP Screen, Urine 01/19/2023 PRESUMPTIVE NEGATIVE  NEGATIVE Final    7-Aminoclonazepam 01/19/2023 <25  Cutoff <25 ng/mL Final    Alpha-Hydroxyalprazolam 01/19/2023 <25  Cutoff <25 ng/mL Final    Alpha-Hydroxymidazolam 01/19/2023 <25  Cutoff <25 ng/mL Final    Alprazolam 01/19/2023 <25  Cutoff <25 ng/mL Final    Chlordiazepoxide 01/19/2023 <25  Cutoff <25 ng/mL Final    Clonazepam 01/19/2023 <25  Cutoff <25 ng/mL Final    Diazepam 01/19/2023 <25  Cutoff <25 ng/mL Final    Lorazepam 01/19/2023 <25  Cutoff <25 ng/mL Final    Midazolam 01/19/2023 <25  Cutoff <25 ng/mL Final    Nordiazepam 01/19/2023 <25  Cutoff <25 ng/mL Final    Oxazepam 01/19/2023 <25  Cutoff <25 ng/mL Final    Temazepam 01/19/2023 <25  Cutoff <25 ng/mL Final    Zolpidem 01/19/2023 <25  Cutoff <25 ng/mL Final    Zolpidem Metabolite (ZCA) 01/19/2023 <25  Cutoff <25 ng/mL Final    6-Acetylmorphine 01/19/2023 <25  Cutoff <25 ng/mL Final    Codeine 01/19/2023 <50  Cutoff <50 ng/mL Final    Hydrocodone 01/19/2023 <25  Cutoff <25 ng/mL Final    Hydromorphone 01/19/2023 <25  Cutoff <25 ng/mL Final    Morphine Urine 01/19/2023 <50  Cutoff <50 ng/mL Final    Norhydrocodone 01/19/2023 <25  Cutoff <25 ng/mL Final    Noroxycodone 01/19/2023 >1000 (A)  Cutoff <25 ng/mL Final    Oxycodone 01/19/2023 1,262 (A)  Cutoff <25 ng/mL Final    Oxymorphone 01/19/2023 31 (A)  Cutoff <25 ng/mL Final    Tramadol 01/19/2023 <50  Cutoff <50 ng/mL Final    O-Desmethyltramadol 01/19/2023 <50  Cutoff <50 ng/mL Final    Fentanyl 01/19/2023 <2.5   Cutoff<2.5 ng/mL Final    Norfentanyl 01/19/2023 <2.5  Cutoff<2.5 ng/mL Final    METHADONE CONFIRMATION,URINE 01/19/2023 <25  Cutoff <25 ng/mL Final    EDDP 01/19/2023 <25  Cutoff <25 ng/mL Final       Radiology: Reviewed imaging in powerchart.  No results found.    Family History   Problem Relation Name Age of Onset    Diabetes Mother      Hypertension Mother      Stroke Father       Social History     Socioeconomic History    Marital status:    Tobacco Use    Smoking status: Every Day     Current packs/day: 0.50     Average packs/day: 0.5 packs/day for 30.0 years (15.0 ttl pk-yrs)     Types: Cigarettes    Smokeless tobacco: Never    Tobacco comments:     I will sneak a couple here and there   Substance and Sexual Activity    Alcohol use: Never    Drug use: Yes     Types: Codeine     Past Medical History:   Diagnosis Date    Personal history of other diseases of the circulatory system     History of hypertension     Past Surgical History:   Procedure Laterality Date    BACK SURGERY  01/23/2018    Back Surgery    COLON SURGERY  07/09/2014    Colon Surgery    OTHER SURGICAL HISTORY  02/11/2019    Endoscopy       Charting was completed using voice recognition technology and may include unintended errors.

## 2024-10-07 DIAGNOSIS — I48.20 CHRONIC ATRIAL FIBRILLATION (MULTI): ICD-10-CM

## 2024-10-08 DIAGNOSIS — E78.2 HYPERLIPIDEMIA, MIXED: ICD-10-CM

## 2024-10-08 DIAGNOSIS — I10 BENIGN ESSENTIAL HYPERTENSION: ICD-10-CM

## 2024-10-08 DIAGNOSIS — E07.9 THYROID DISORDER: ICD-10-CM

## 2024-10-08 RX ORDER — CHLORTHALIDONE 25 MG/1
25 TABLET ORAL DAILY
Qty: 90 TABLET | Refills: 2 | Status: SHIPPED | OUTPATIENT
Start: 2024-10-08

## 2024-10-08 RX ORDER — ATORVASTATIN CALCIUM 40 MG/1
40 TABLET, FILM COATED ORAL DAILY
Qty: 90 TABLET | Refills: 2 | Status: SHIPPED | OUTPATIENT
Start: 2024-10-08

## 2024-10-08 RX ORDER — LEVOTHYROXINE SODIUM 75 UG/1
TABLET ORAL
Qty: 90 TABLET | Refills: 2 | Status: SHIPPED | OUTPATIENT
Start: 2024-10-08

## 2024-10-09 ENCOUNTER — TELEPHONE (OUTPATIENT)
Dept: PRIMARY CARE | Facility: CLINIC | Age: 74
End: 2024-10-09

## 2024-10-09 DIAGNOSIS — E87.6 LOW SERUM POTASSIUM: ICD-10-CM

## 2024-10-09 RX ORDER — APIXABAN 5 MG/1
TABLET, FILM COATED ORAL
Qty: 180 TABLET | Refills: 2 | Status: SHIPPED | OUTPATIENT
Start: 2024-10-09

## 2024-10-21 ENCOUNTER — TRANSCRIBE ORDERS (OUTPATIENT)
Dept: PRIMARY CARE | Facility: CLINIC | Age: 74
End: 2024-10-21
Payer: COMMERCIAL

## 2024-10-29 ENCOUNTER — APPOINTMENT (OUTPATIENT)
Dept: PAIN MEDICINE | Facility: CLINIC | Age: 74
End: 2024-10-29
Payer: COMMERCIAL

## 2024-10-29 DIAGNOSIS — M51.369 ANNULAR TEAR OF LUMBAR DISC: ICD-10-CM

## 2024-10-29 DIAGNOSIS — M54.32 BILATERAL SCIATICA: ICD-10-CM

## 2024-10-29 DIAGNOSIS — M50.30 DEGENERATION OF INTERVERTEBRAL DISC OF CERVICAL REGION: ICD-10-CM

## 2024-10-29 DIAGNOSIS — M54.31 BILATERAL SCIATICA: ICD-10-CM

## 2024-10-29 DIAGNOSIS — M51.369 DEGENERATION OF INTERVERTEBRAL DISC OF LUMBAR REGION: ICD-10-CM

## 2024-10-29 DIAGNOSIS — M51.26 LUMBAR HERNIATED DISC: ICD-10-CM

## 2024-10-29 PROCEDURE — G2211 COMPLEX E/M VISIT ADD ON: HCPCS | Performed by: PHYSICAL MEDICINE & REHABILITATION

## 2024-10-29 PROCEDURE — 99214 OFFICE O/P EST MOD 30 MIN: CPT | Performed by: PHYSICAL MEDICINE & REHABILITATION

## 2024-10-29 PROCEDURE — 1157F ADVNC CARE PLAN IN RCRD: CPT | Performed by: PHYSICAL MEDICINE & REHABILITATION

## 2024-10-29 RX ORDER — GABAPENTIN 300 MG/1
300 CAPSULE ORAL 3 TIMES DAILY
Qty: 90 CAPSULE | Refills: 1 | Status: SHIPPED | OUTPATIENT
Start: 2024-10-29 | End: 2024-11-28

## 2024-10-29 RX ORDER — OXYCODONE AND ACETAMINOPHEN 10; 325 MG/1; MG/1
1 TABLET ORAL EVERY 6 HOURS PRN
Qty: 112 TABLET | Refills: 0 | Status: SHIPPED | OUTPATIENT
Start: 2024-11-29 | End: 2024-12-27

## 2024-10-29 RX ORDER — OXYCODONE AND ACETAMINOPHEN 10; 325 MG/1; MG/1
1 TABLET ORAL EVERY 6 HOURS PRN
Qty: 112 TABLET | Refills: 0 | Status: SHIPPED | OUTPATIENT
Start: 2024-11-01 | End: 2024-11-29

## 2024-11-07 ENCOUNTER — TELEPHONE (OUTPATIENT)
Dept: PRIMARY CARE | Facility: CLINIC | Age: 74
End: 2024-11-07
Payer: COMMERCIAL

## 2024-11-07 NOTE — TELEPHONE ENCOUNTER
Pt would like a refill on albuterol inhaler sent to [facility] in [place]. Would also like to know results of most recent BW

## 2024-11-08 DIAGNOSIS — J43.2 CENTRILOBULAR EMPHYSEMA (MULTI): ICD-10-CM

## 2024-11-08 RX ORDER — ALBUTEROL SULFATE 90 UG/1
2 INHALANT RESPIRATORY (INHALATION) 2 TIMES DAILY
Qty: 18 G | Refills: 2 | Status: SHIPPED | OUTPATIENT
Start: 2024-11-08

## 2024-11-11 DIAGNOSIS — E87.6 LOW SERUM POTASSIUM: ICD-10-CM

## 2024-11-20 NOTE — TELEPHONE ENCOUNTER
ALBUTEROL INHALER    [facility]   What Is The Reason For Today's Visit?: Preventative Skin Check

## 2024-12-02 ENCOUNTER — APPOINTMENT (OUTPATIENT)
Dept: CARDIOLOGY | Facility: HOSPITAL | Age: 74
End: 2024-12-02
Payer: COMMERCIAL

## 2024-12-02 ENCOUNTER — TELEPHONE (OUTPATIENT)
Dept: PRIMARY CARE | Facility: CLINIC | Age: 74
End: 2024-12-02
Payer: COMMERCIAL

## 2024-12-02 LAB
ANION GAP SERPL CALC-SCNC: 11 MMOL/L (ref 10–20)
BASOPHILS # BLD AUTO: 0.03 X10*3/UL (ref 0–0.1)
BASOPHILS NFR BLD AUTO: 0.4 %
BUN SERPL-MCNC: 21 MG/DL (ref 6–23)
CALCIUM SERPL-MCNC: 8.3 MG/DL (ref 8.6–10.3)
CHLORIDE SERPL-SCNC: 103 MMOL/L (ref 98–107)
CO2 SERPL-SCNC: 31 MMOL/L (ref 21–32)
CREAT SERPL-MCNC: 1.02 MG/DL (ref 0.5–1.05)
EGFRCR SERPLBLD CKD-EPI 2021: 57 ML/MIN/1.73M*2
EOSINOPHIL # BLD AUTO: 0.15 X10*3/UL (ref 0–0.4)
EOSINOPHIL NFR BLD AUTO: 2.2 %
ERYTHROCYTE [DISTWIDTH] IN BLOOD BY AUTOMATED COUNT: 13.5 % (ref 11.5–14.5)
GLUCOSE SERPL-MCNC: 162 MG/DL (ref 74–99)
HCT VFR BLD AUTO: 37.2 % (ref 36–46)
HGB BLD-MCNC: 11.9 G/DL (ref 12–16)
IMM GRANULOCYTES # BLD AUTO: 0.01 X10*3/UL (ref 0–0.5)
IMM GRANULOCYTES NFR BLD AUTO: 0.1 % (ref 0–0.9)
LYMPHOCYTES # BLD AUTO: 2.9 X10*3/UL (ref 0.8–3)
LYMPHOCYTES NFR BLD AUTO: 43.1 %
MAGNESIUM SERPL-MCNC: 0.9 MG/DL (ref 1.6–2.4)
MCH RBC QN AUTO: 30.1 PG (ref 26–34)
MCHC RBC AUTO-ENTMCNC: 32 G/DL (ref 32–36)
MCV RBC AUTO: 94 FL (ref 80–100)
MONOCYTES # BLD AUTO: 0.44 X10*3/UL (ref 0.05–0.8)
MONOCYTES NFR BLD AUTO: 6.5 %
NEUTROPHILS # BLD AUTO: 3.2 X10*3/UL (ref 1.6–5.5)
NEUTROPHILS NFR BLD AUTO: 47.7 %
NRBC BLD-RTO: 0 /100 WBCS (ref 0–0)
PLATELET # BLD AUTO: 177 X10*3/UL (ref 150–450)
POTASSIUM SERPL-SCNC: 3 MMOL/L (ref 3.5–5.3)
RBC # BLD AUTO: 3.95 X10*6/UL (ref 4–5.2)
SODIUM SERPL-SCNC: 142 MMOL/L (ref 136–145)
WBC # BLD AUTO: 6.7 X10*3/UL (ref 4.4–11.3)

## 2024-12-02 PROCEDURE — 85025 COMPLETE CBC W/AUTO DIFF WBC: CPT | Performed by: PHYSICIAN ASSISTANT

## 2024-12-02 PROCEDURE — 93005 ELECTROCARDIOGRAM TRACING: CPT

## 2024-12-02 PROCEDURE — 36415 COLL VENOUS BLD VENIPUNCTURE: CPT | Performed by: PHYSICIAN ASSISTANT

## 2024-12-02 PROCEDURE — 82374 ASSAY BLOOD CARBON DIOXIDE: CPT | Performed by: PHYSICIAN ASSISTANT

## 2024-12-02 PROCEDURE — 99284 EMERGENCY DEPT VISIT MOD MDM: CPT | Performed by: EMERGENCY MEDICINE

## 2024-12-02 PROCEDURE — 83735 ASSAY OF MAGNESIUM: CPT | Performed by: PHYSICIAN ASSISTANT

## 2024-12-02 ASSESSMENT — COLUMBIA-SUICIDE SEVERITY RATING SCALE - C-SSRS
1. IN THE PAST MONTH, HAVE YOU WISHED YOU WERE DEAD OR WISHED YOU COULD GO TO SLEEP AND NOT WAKE UP?: NO
6. HAVE YOU EVER DONE ANYTHING, STARTED TO DO ANYTHING, OR PREPARED TO DO ANYTHING TO END YOUR LIFE?: NO
2. HAVE YOU ACTUALLY HAD ANY THOUGHTS OF KILLING YOURSELF?: NO

## 2024-12-03 ENCOUNTER — APPOINTMENT (OUTPATIENT)
Dept: CARDIOLOGY | Facility: HOSPITAL | Age: 74
End: 2024-12-03
Payer: COMMERCIAL

## 2024-12-03 ENCOUNTER — HOSPITAL ENCOUNTER (EMERGENCY)
Facility: HOSPITAL | Age: 74
Discharge: HOME | End: 2024-12-03
Attending: EMERGENCY MEDICINE
Payer: COMMERCIAL

## 2024-12-03 VITALS
BODY MASS INDEX: 18.05 KG/M2 | OXYGEN SATURATION: 95 % | HEART RATE: 70 BPM | HEIGHT: 67 IN | TEMPERATURE: 97.7 F | DIASTOLIC BLOOD PRESSURE: 102 MMHG | SYSTOLIC BLOOD PRESSURE: 155 MMHG | RESPIRATION RATE: 20 BRPM | WEIGHT: 115 LBS

## 2024-12-03 DIAGNOSIS — E83.42 HYPOMAGNESEMIA: Primary | ICD-10-CM

## 2024-12-03 DIAGNOSIS — E87.6 HYPOKALEMIA: ICD-10-CM

## 2024-12-03 LAB
Q ONSET: 223 MS
QRS COUNT: 10 BEATS
QRS DURATION: 86 MS
QT INTERVAL: 430 MS
QTC CALCULATION(BAZETT): 440 MS
QTC FREDERICIA: 437 MS
R AXIS: 84 DEGREES
T AXIS: 62 DEGREES
T OFFSET: 438 MS
VENTRICULAR RATE: 63 BPM

## 2024-12-03 PROCEDURE — 2500000004 HC RX 250 GENERAL PHARMACY W/ HCPCS (ALT 636 FOR OP/ED): Performed by: NURSE PRACTITIONER

## 2024-12-03 PROCEDURE — 93005 ELECTROCARDIOGRAM TRACING: CPT

## 2024-12-03 PROCEDURE — 96367 TX/PROPH/DG ADDL SEQ IV INF: CPT

## 2024-12-03 PROCEDURE — 96365 THER/PROPH/DIAG IV INF INIT: CPT

## 2024-12-03 PROCEDURE — 2500000002 HC RX 250 W HCPCS SELF ADMINISTERED DRUGS (ALT 637 FOR MEDICARE OP, ALT 636 FOR OP/ED): Performed by: NURSE PRACTITIONER

## 2024-12-03 PROCEDURE — 96366 THER/PROPH/DIAG IV INF ADDON: CPT

## 2024-12-03 RX ORDER — POTASSIUM CHLORIDE 20 MEQ/1
40 TABLET, EXTENDED RELEASE ORAL ONCE
Status: COMPLETED | OUTPATIENT
Start: 2024-12-03 | End: 2024-12-03

## 2024-12-03 RX ORDER — POTASSIUM CHLORIDE 14.9 MG/ML
20 INJECTION INTRAVENOUS ONCE
Status: COMPLETED | OUTPATIENT
Start: 2024-12-03 | End: 2024-12-03

## 2024-12-03 RX ORDER — MAGNESIUM SULFATE HEPTAHYDRATE 40 MG/ML
4 INJECTION, SOLUTION INTRAVENOUS ONCE
Status: COMPLETED | OUTPATIENT
Start: 2024-12-03 | End: 2024-12-03

## 2024-12-03 NOTE — DISCHARGE INSTRUCTIONS
You were seen in the ED for low potassium and magnesium.  These were repleted.  Follow up with your primary doctor.

## 2024-12-03 NOTE — ED TRIAGE NOTES
Pt states she was told on 11/29 to come into ed for low potassium and low magnesium.  Pt states she tripped yesterday and injured her right upper arm. No other complaints

## 2024-12-03 NOTE — ED PROVIDER NOTES
HPI  Abi Weston is a 74 y.o. female with a history of HTN, hypothyroidism, atrial fibrillation on anticoagulation presenting to the emergency department abnormal labs.  She had outpatient laboratory testing that revealed hypomagnesemia and hypokalemia.  She otherwise has no acute complaints today.  She denies any muscle pain or weakness.  She denies any chest pain, difficulty breathing, lightheadedness.    PMH  Past Medical History:   Diagnosis Date    Personal history of other diseases of the circulatory system     History of hypertension       Meds  Current Outpatient Medications   Medication Instructions    albuterol 90 mcg/actuation inhaler 2 puffs, inhalation, 2 times daily    atorvastatin (LIPITOR) 40 mg, oral, Daily    biotin 10 mg tablet oral    chlorthalidone (HYGROTON) 25 mg, oral, Daily    cholecalciferol (Vitamin D-3) 50 mcg (2,000 unit) capsule oral    cholestyramine (Questran) 4 gram packet dissolve 1 packet in liquid and drink by mouth daily    Eliquis 5 mg tablet TAKE ONE TABLET (5 MG) BY MOUTH TWO TIMES A DAY.    escitalopram (LEXAPRO) 20 mg, oral, Daily    gabapentin (NEURONTIN) 300 mg, oral, 3 times daily    levothyroxine (Synthroid, Levoxyl) 75 mcg tablet TAKE 1 TABLET DAILY AS     DIRECTED    levothyroxine (SYNTHROID, LEVOXYL) 100 mcg, oral, Daily    lisinopril 40 mg, oral, Daily RT    metoprolol succinate XL (TOPROL-XL) 25 mg, oral, Daily    mirtazapine (REMERON) 7.5 mg, oral, Nightly    multivitamin with minerals (HAIR,SKIN AND NAILS ORAL) Hair skin and nails supplement    naloxone (Narcan) 4 mg/0.1 mL nasal spray nasal    Norvasc 5 mg    oxyCODONE-acetaminophen (Percocet)  mg tablet 1 tablet, oral, Every 6 hours PRN    pantoprazole (PROTONIX) 40 mg, oral, Daily       Allergies  Allergies   Allergen Reactions    Cefazolin Hives     Burn hives    red blotches        SHx  Social History     Tobacco Use    Smoking status: Every Day     Current packs/day: 0.50     Average packs/day: 0.5  "packs/day for 30.0 years (15.0 ttl pk-yrs)     Types: Cigarettes    Smokeless tobacco: Never    Tobacco comments:     I will sneak a couple here and there   Substance Use Topics    Alcohol use: Never    Drug use: Yes     Types: Codeine       ------------------------------------------------------------------------------------------------------------------------------------------    BP (!) 155/98   Pulse 71   Temp 36.5 °C (97.7 °F) (Temporal)   Resp 18   Ht 1.7 m (5' 6.93\")   Wt 52.2 kg (115 lb)   SpO2 95%   BMI 18.05 kg/m²     Physical Exam  Vitals and nursing note reviewed.   Constitutional:       General: She is not in acute distress.     Appearance: Normal appearance.   HENT:      Head: Normocephalic and atraumatic.      Right Ear: External ear normal.      Left Ear: External ear normal.   Eyes:      Extraocular Movements: Extraocular movements intact.      Conjunctiva/sclera: Conjunctivae normal.   Cardiovascular:      Rate and Rhythm: Normal rate and regular rhythm.      Pulses: Normal pulses.      Heart sounds: Normal heart sounds. No murmur heard.     No friction rub. No gallop.   Pulmonary:      Effort: Pulmonary effort is normal. No respiratory distress.      Breath sounds: No wheezing, rhonchi or rales.   Abdominal:      General: There is no distension.      Palpations: Abdomen is soft.      Tenderness: There is no abdominal tenderness. There is no guarding or rebound.   Musculoskeletal:         General: No swelling. Normal range of motion.      Cervical back: Neck supple.      Right lower leg: No edema.      Left lower leg: No edema.   Skin:     General: Skin is warm and dry.   Neurological:      General: No focal deficit present.      Mental Status: She is alert and oriented to person, place, and time.   Psychiatric:         Mood and Affect: Mood normal.      "     ------------------------------------------------------------------------------------------------------------------------------------------    Medical Decision Making: This is a 74-year-old female presenting to the emergency department abnormal labs.  Her vital signs are normal and stable.  On examination, she has no abnormalities.  The patient was found to incidentally have hypomagnesemia and hypokalemia.  This may be related to her chlorthalidone but ultimately, we will replete these in the emergency department today and have her follow-up with her primary care provider for further testing as well as consideration for chronic home therapy in order to help replete these.  Her QTc is prolonged today, however I suspect this will improve with magnesium repletion.  She does have an history of atrial flutter versus atrial fibrillation is on anticoagulation for this.  As long she remained stable during her IV infusion of her medications, she will be discharged home in stable condition.      EKG interpreted by me:  ED Course as of 12/03/24 0910   Tue Dec 03, 2024   0831 EKG:  Rate 68  Atrial fibrillation v flutter  Normal axis  Qtc 489  U wave present  No ischemic hcnage [AG]      ED Course User Index  [AG] MD Thanh Ramos MD  Emergency Medicine Attending       Thanh Potts MD  12/03/24 0986

## 2024-12-03 NOTE — ED TRIAGE NOTES
The patient was seen and examined in triage.    History of Present Illness: The patient is a 74-year-old female who presents emergency department due to abnormal labs.  She reports that 3 days ago she had routine blood work drawn.  She was told that her potassium was very low and she needed to go to the hospital.  She reports that she has been otherwise been feeling well and did not want to come to the hospital.  She reports that her family members convinced her to come in today.  She has no complaints.    Brief Physical Exam:  Exam is limited by the patient sitting in a chair in triage.   Heart: Regular rate and rhythm.   Lungs: Clear to auscultation bilaterally.   Abdomen: Soft, nondistended, normoactive bowel sounds, nontender     Plan: Appropriate labs and diagnostic imaging were ordered.      For the remainder of the patient's workup and ED course, please refer to the main ED provider note. We discussed need for diagnostic testing including laboratory studies and imaging.  We also discussed that they may be asked to wait in the waiting room while these tests are pending.  They understand that if they choose to leave without having the testing completed or resulted that we cannot rule out acute life threatening illnesses and the risks involved could lead to worsening condition, permanent disability or even death.      Disclaimer: This note was dictated by speech recognition. Minor errors in transcription may be present. Please call if questions.

## 2024-12-04 LAB
Q ONSET: 224 MS
QRS COUNT: 11 BEATS
QRS DURATION: 80 MS
QT INTERVAL: 460 MS
QTC CALCULATION(BAZETT): 489 MS
QTC FREDERICIA: 479 MS
R AXIS: 81 DEGREES
T AXIS: 75 DEGREES
T OFFSET: 454 MS
VENTRICULAR RATE: 68 BPM

## 2024-12-11 ENCOUNTER — APPOINTMENT (OUTPATIENT)
Dept: PRIMARY CARE | Facility: CLINIC | Age: 74
End: 2024-12-11
Payer: COMMERCIAL

## 2024-12-11 ENCOUNTER — OFFICE VISIT (OUTPATIENT)
Dept: PRIMARY CARE | Facility: CLINIC | Age: 74
End: 2024-12-11
Payer: COMMERCIAL

## 2024-12-11 VITALS
DIASTOLIC BLOOD PRESSURE: 103 MMHG | HEART RATE: 69 BPM | BODY MASS INDEX: 18.05 KG/M2 | TEMPERATURE: 96.6 F | HEIGHT: 67 IN | OXYGEN SATURATION: 95 % | SYSTOLIC BLOOD PRESSURE: 180 MMHG

## 2024-12-11 DIAGNOSIS — E83.42 HYPOMAGNESEMIA: ICD-10-CM

## 2024-12-11 DIAGNOSIS — I10 BENIGN ESSENTIAL HYPERTENSION: Primary | ICD-10-CM

## 2024-12-11 PROCEDURE — 1160F RVW MEDS BY RX/DR IN RCRD: CPT | Performed by: FAMILY MEDICINE

## 2024-12-11 PROCEDURE — 1159F MED LIST DOCD IN RCRD: CPT | Performed by: FAMILY MEDICINE

## 2024-12-11 PROCEDURE — 3080F DIAST BP >= 90 MM HG: CPT | Performed by: FAMILY MEDICINE

## 2024-12-11 PROCEDURE — 1157F ADVNC CARE PLAN IN RCRD: CPT | Performed by: FAMILY MEDICINE

## 2024-12-11 PROCEDURE — 3077F SYST BP >= 140 MM HG: CPT | Performed by: FAMILY MEDICINE

## 2024-12-11 PROCEDURE — 99214 OFFICE O/P EST MOD 30 MIN: CPT | Performed by: FAMILY MEDICINE

## 2024-12-11 RX ORDER — AMLODIPINE BESYLATE 5 MG/1
5 TABLET ORAL
Qty: 180 TABLET | Refills: 3 | Status: SHIPPED | OUTPATIENT
Start: 2024-12-11 | End: 2025-12-11

## 2024-12-11 NOTE — PROGRESS NOTES
Subjective   Patient ID: Abi Weston is a 74 y.o. female who presents for Follow-up.    Assessment/Plan     Problem List Items Addressed This Visit       Benign essential hypertension - Primary    Relevant Medications    amLODIPine (Norvasc) 5 mg tablet     Time spent around 10 minutes obtaining and discussing depression screening using PHQ 9 questions with results documented in the chart    Continue current medication    Follow-up with cardiology  Advised to stay away from smoking  Continue atorvastatin 40 mg  Follow-up in 4 months  Mammogram April 2022 within normal limits  Scan showed osteopenia in April 2022  Declined flu vaccine      Up-to-date with pneumonia vaccine  New shingles vaccine patient declined for now    Follow-up in 6 months  Come back early with any new sign and symptoms patient understands and agreement plan.     HPI    74-year-old female here for follow-up on recent ER visit with hypokalemia hypomagnesemia most likely secondary to chlorthalidone    ER records reviewed received replacements    Will discontinue chlorthalidone  Start with amlodipine 5 mg daily for 1 week then 5 mg p.o. 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 Rossi  Status post lumbar spinal fusion and decompression - was seen by neurosurgery Dr. Ramos - history of spinal surgery Ã--2 - following up with pain management  Chronic pain syndrome - lower back pain - following up with pain management  Osteoarthritis  Status post right carpal tunnel surgery feeling better  GERD  Diastolic CHF     Started smoking again advised patient to stop smoking  Following up with cardiology and pain management  Still smoking       Complaining of weight loss we will consider low-dose of Remeron 7.5 mg risk-benefit discussed  Advance directives:. Advanced Care Planning discussed and documented advance care plan or surrogate decision maker documented in the medical record.   A Conversation about Advance directives of at least 16 minutes has occurred. Actual time spent: I spent >16 minutes discussing Advance Care Planning including the explanation and discussion of advance directives. If patient does not have current up to date documents, examples and information provided on how to create both Living Will and Power of . Patient was encouraged to work on completing these documents.  Conversation with: The conversation with the patient and/or participants was voluntary.  Documents discussed and/or completed: Living Will was discussed with participants. Healthcare POA was discussed with participants. Patient educated on how to obtain Living Will and Power of . Discussed patient end of life choices.   Patient has living will and patient's daughter liudmila is healthcare power of .  Patient is full code.      Allergies   Allergen Reactions    Cefazolin Hives     Burn hives    red blotches       Current Outpatient Medications   Medication Sig Dispense Refill    albuterol 90 mcg/actuation inhaler Inhale 2 puffs 2 times a day. 18 g 2    atorvastatin (Lipitor) 40 mg tablet TAKE 1 TABLET DAILY 90 tablet 2    biotin 10 mg tablet Take by mouth.       "chlorthalidone (Hygroton) 25 mg tablet TAKE 1 TABLET DAILY 90 tablet 2    cholecalciferol (Vitamin D-3) 50 mcg (2,000 unit) capsule Take by mouth.      Eliquis 5 mg tablet TAKE ONE TABLET (5 MG) BY MOUTH TWO TIMES A DAY. 180 tablet 2    escitalopram (Lexapro) 20 mg tablet Take 1 tablet (20 mg) by mouth once daily. 90 tablet 2    levothyroxine (Synthroid, Levoxyl) 75 mcg tablet TAKE 1 TABLET DAILY AS     DIRECTED 90 tablet 2    lisinopril 10 mg tablet Take 4 tablets (40 mg) by mouth once daily.      metoprolol succinate XL (Toprol-XL) 25 mg 24 hr tablet TAKE 1 TABLET DAILY 90 tablet 1    multivitamin with minerals (HAIR,SKIN AND NAILS ORAL) Hair skin and nails supplement      naloxone (Narcan) 4 mg/0.1 mL nasal spray Administer into affected nostril(s).      oxyCODONE-acetaminophen (Percocet)  mg tablet Take 1 tablet by mouth every 6 hours if needed for severe pain (7 - 10) for up to 28 days. Do not fill before November 29, 2024. 112 tablet 0    pantoprazole (ProtoNix) 40 mg EC tablet TAKE 1 TABLET DAILY 90 tablet 2    amLODIPine (Norvasc) 5 mg tablet Take 1 tablet (5 mg) by mouth 2 times daily (morning and late afternoon). Start with 1 tablet daily for once a day for 1 week then 1 tablet p.o. twice daily 180 tablet 3    cholestyramine (Questran) 4 gram packet dissolve 1 packet in liquid and drink by mouth daily (Patient not taking: Reported on 12/11/2024)      gabapentin (Neurontin) 300 mg capsule Take 1 capsule (300 mg) by mouth 3 times a day. 90 capsule 1    mirtazapine (Remeron) 7.5 mg tablet Take 1 tablet (7.5 mg) by mouth once daily at bedtime. (Patient not taking: Reported on 12/11/2024) 30 tablet 2     No current facility-administered medications for this visit.       Objective   Visit Vitals  BP (!) 180/103 (BP Location: Left arm, Patient Position: Sitting)   Pulse 69   Temp 35.9 °C (96.6 °F)   Ht 1.7 m (5' 6.93\")   SpO2 95%   BMI 18.05 kg/m²   OB Status Unknown   Smoking Status Every Day   BSA 1.57 m² "     Physical Exam  Constitutional:       General: He is not in acute distress.     Appearance: Normal appearance.   HENT:      Head: Normocephalic and atraumatic.      Nose: Nose normal.   Eyes:      Extraocular Movements: Extraocular movements intact.      Conjunctiva/sclera: Conjunctivae normal.   Cardiovascular:      Rate and Rhythm: Normal rate and regular rhythm.   Pulmonary:      Effort: Pulmonary effort is normal.      Breath sounds: Normal breath sounds.   Skin:     General: Skin is warm.   Neurological:      Mental Status: He is alert and oriented to person, place, and time.   Psychiatric:         Mood and Affect: Mood normal.         Behavior: Behavior normal.   Immunization History   Administered Date(s) Administered    Flu vaccine, trivalent, preservative free, HIGH-DOSE, age 65y+ (Fluzone) 09/09/2024    Moderna COVID-19 vaccine, bivalent, blue cap/gray label *Check age/dose* 12/20/2022    Moderna SARS-CoV-2 Vaccination 03/03/2021, 03/31/2021    Pneumococcal conjugate vaccine, 13-valent (PREVNAR 13) 09/04/2019    Pneumococcal polysaccharide vaccine, 23-valent, age 2 years and older (PNEUMOVAX 23) 08/29/2014, 02/16/2021    Td vaccine, age 7 years and older (TDVAX) 10/26/2011, 03/11/2016    Td vaccine, age 7 years and older (TENIVAC) 03/11/2016    Zoster vaccine, recombinant, adult (SHINGRIX) 09/04/2019       Review of Systems    Admission on 12/03/2024, Discharged on 12/03/2024   Component Date Value Ref Range Status    WBC 12/02/2024 6.7  4.4 - 11.3 x10*3/uL Final    nRBC 12/02/2024 0.0  0.0 - 0.0 /100 WBCs Final    RBC 12/02/2024 3.95 (L)  4.00 - 5.20 x10*6/uL Final    Hemoglobin 12/02/2024 11.9 (L)  12.0 - 16.0 g/dL Final    Hematocrit 12/02/2024 37.2  36.0 - 46.0 % Final    MCV 12/02/2024 94  80 - 100 fL Final    MCH 12/02/2024 30.1  26.0 - 34.0 pg Final    MCHC 12/02/2024 32.0  32.0 - 36.0 g/dL Final    RDW 12/02/2024 13.5  11.5 - 14.5 % Final    Platelets 12/02/2024 177  150 - 450 x10*3/uL Final     Neutrophils % 12/02/2024 47.7  40.0 - 80.0 % Final    Immature Granulocytes %, Automated 12/02/2024 0.1  0.0 - 0.9 % Final    Lymphocytes % 12/02/2024 43.1  13.0 - 44.0 % Final    Monocytes % 12/02/2024 6.5  2.0 - 10.0 % Final    Eosinophils % 12/02/2024 2.2  0.0 - 6.0 % Final    Basophils % 12/02/2024 0.4  0.0 - 2.0 % Final    Neutrophils Absolute 12/02/2024 3.20  1.60 - 5.50 x10*3/uL Final    Immature Granulocytes Absolute, Au* 12/02/2024 0.01  0.00 - 0.50 x10*3/uL Final    Lymphocytes Absolute 12/02/2024 2.90  0.80 - 3.00 x10*3/uL Final    Monocytes Absolute 12/02/2024 0.44  0.05 - 0.80 x10*3/uL Final    Eosinophils Absolute 12/02/2024 0.15  0.00 - 0.40 x10*3/uL Final    Basophils Absolute 12/02/2024 0.03  0.00 - 0.10 x10*3/uL Final    Glucose 12/02/2024 162 (H)  74 - 99 mg/dL Final    Sodium 12/02/2024 142  136 - 145 mmol/L Final    Potassium 12/02/2024 3.0 (L)  3.5 - 5.3 mmol/L Final    Chloride 12/02/2024 103  98 - 107 mmol/L Final    Bicarbonate 12/02/2024 31  21 - 32 mmol/L Final    Anion Gap 12/02/2024 11  10 - 20 mmol/L Final    Urea Nitrogen 12/02/2024 21  6 - 23 mg/dL Final    Creatinine 12/02/2024 1.02  0.50 - 1.05 mg/dL Final    eGFR 12/02/2024 57 (L)  >60 mL/min/1.73m*2 Final    Calcium 12/02/2024 8.3 (L)  8.6 - 10.3 mg/dL Final    Magnesium 12/02/2024 0.90 (L)  1.60 - 2.40 mg/dL Final    Ventricular Rate 12/02/2024 63  BPM Preliminary    QRS Duration 12/02/2024 86  ms Preliminary    QT Interval 12/02/2024 430  ms Preliminary    QTC Calculation(Bazett) 12/02/2024 440  ms Preliminary    R Axis 12/02/2024 84  degrees Preliminary    T Axis 12/02/2024 62  degrees Preliminary    QRS Count 12/02/2024 10  beats Preliminary    Q Onset 12/02/2024 223  ms Preliminary    T Offset 12/02/2024 438  ms Preliminary    QTC Fredericia 12/02/2024 437  ms Preliminary    Ventricular Rate 12/03/2024 68  BPM Preliminary    QRS Duration 12/03/2024 80  ms Preliminary    QT Interval 12/03/2024 460  ms Preliminary    QTC  Calculation(Bazett) 12/03/2024 489  ms Preliminary    R Axis 12/03/2024 81  degrees Preliminary    T Axis 12/03/2024 75  degrees Preliminary    QRS Count 12/03/2024 11  beats Preliminary    Q Onset 12/03/2024 224  ms Preliminary    T Offset 12/03/2024 454  ms Preliminary    QTC Fredericia 12/03/2024 479  ms Preliminary       Radiology: Reviewed imaging in powerchart.  No results found.    Family History   Problem Relation Name Age of Onset    Diabetes Mother      Hypertension Mother      Stroke Father       Social History     Socioeconomic History    Marital status:    Tobacco Use    Smoking status: Every Day     Current packs/day: 0.50     Average packs/day: 0.5 packs/day for 30.0 years (15.0 ttl pk-yrs)     Types: Cigarettes    Smokeless tobacco: Never    Tobacco comments:     I will sneak a couple here and there   Substance and Sexual Activity    Alcohol use: Never    Drug use: Yes     Types: Codeine     Past Medical History:   Diagnosis Date    Personal history of other diseases of the circulatory system     History of hypertension     Past Surgical History:   Procedure Laterality Date    BACK SURGERY  01/23/2018    Back Surgery    COLON SURGERY  07/09/2014    Colon Surgery    OTHER SURGICAL HISTORY  02/11/2019    Endoscopy       Charting was completed using voice recognition technology and may include unintended errors.

## 2024-12-19 DIAGNOSIS — I10 BENIGN ESSENTIAL HYPERTENSION: ICD-10-CM

## 2024-12-20 RX ORDER — METOPROLOL SUCCINATE 25 MG/1
25 TABLET, EXTENDED RELEASE ORAL DAILY
Qty: 90 TABLET | Refills: 2 | Status: SHIPPED | OUTPATIENT
Start: 2024-12-20

## 2024-12-23 ENCOUNTER — APPOINTMENT (OUTPATIENT)
Dept: PAIN MEDICINE | Facility: CLINIC | Age: 74
End: 2024-12-23
Payer: COMMERCIAL

## 2024-12-23 DIAGNOSIS — M51.26 LUMBAR HERNIATED DISC: ICD-10-CM

## 2024-12-23 DIAGNOSIS — M51.369 ANNULAR TEAR OF LUMBAR DISC: ICD-10-CM

## 2024-12-23 DIAGNOSIS — M50.30 DEGENERATION OF INTERVERTEBRAL DISC OF CERVICAL REGION: ICD-10-CM

## 2024-12-23 DIAGNOSIS — M51.369 DEGENERATION OF INTERVERTEBRAL DISC OF LUMBAR REGION: ICD-10-CM

## 2024-12-23 DIAGNOSIS — M54.31 BILATERAL SCIATICA: ICD-10-CM

## 2024-12-23 DIAGNOSIS — M54.32 BILATERAL SCIATICA: ICD-10-CM

## 2024-12-23 PROCEDURE — 1157F ADVNC CARE PLAN IN RCRD: CPT | Performed by: PHYSICAL MEDICINE & REHABILITATION

## 2024-12-23 PROCEDURE — 1159F MED LIST DOCD IN RCRD: CPT | Performed by: PHYSICAL MEDICINE & REHABILITATION

## 2024-12-23 PROCEDURE — 99214 OFFICE O/P EST MOD 30 MIN: CPT | Performed by: PHYSICAL MEDICINE & REHABILITATION

## 2024-12-23 PROCEDURE — G2211 COMPLEX E/M VISIT ADD ON: HCPCS | Performed by: PHYSICAL MEDICINE & REHABILITATION

## 2024-12-23 PROCEDURE — 1125F AMNT PAIN NOTED PAIN PRSNT: CPT | Performed by: PHYSICAL MEDICINE & REHABILITATION

## 2024-12-23 PROCEDURE — 1160F RVW MEDS BY RX/DR IN RCRD: CPT | Performed by: PHYSICAL MEDICINE & REHABILITATION

## 2024-12-23 RX ORDER — OXYCODONE AND ACETAMINOPHEN 10; 325 MG/1; MG/1
1 TABLET ORAL EVERY 6 HOURS PRN
Qty: 100 TABLET | Refills: 0 | Status: SHIPPED | OUTPATIENT
Start: 2024-12-27 | End: 2025-01-24

## 2024-12-23 RX ORDER — OXYCODONE AND ACETAMINOPHEN 10; 325 MG/1; MG/1
1 TABLET ORAL EVERY 6 HOURS PRN
Qty: 100 TABLET | Refills: 0 | Status: SHIPPED | OUTPATIENT
Start: 2025-01-24 | End: 2025-02-21

## 2024-12-23 ASSESSMENT — PATIENT HEALTH QUESTIONNAIRE - PHQ9
1. LITTLE INTEREST OR PLEASURE IN DOING THINGS: NOT AT ALL
10. IF YOU CHECKED OFF ANY PROBLEMS, HOW DIFFICULT HAVE THESE PROBLEMS MADE IT FOR YOU TO DO YOUR WORK, TAKE CARE OF THINGS AT HOME, OR GET ALONG WITH OTHER PEOPLE: NOT DIFFICULT AT ALL
2. FEELING DOWN, DEPRESSED OR HOPELESS: MORE THAN HALF THE DAYS
SUM OF ALL RESPONSES TO PHQ9 QUESTIONS 1 AND 2: 2

## 2024-12-23 ASSESSMENT — ANXIETY QUESTIONNAIRES
1. FEELING NERVOUS, ANXIOUS, OR ON EDGE: MORE THAN HALF THE DAYS
GAD7 TOTAL SCORE: 6
3. WORRYING TOO MUCH ABOUT DIFFERENT THINGS: MORE THAN HALF THE DAYS
2. NOT BEING ABLE TO STOP OR CONTROL WORRYING: MORE THAN HALF THE DAYS
IF YOU CHECKED OFF ANY PROBLEMS ON THIS QUESTIONNAIRE, HOW DIFFICULT HAVE THESE PROBLEMS MADE IT FOR YOU TO DO YOUR WORK, TAKE CARE OF THINGS AT HOME, OR GET ALONG WITH OTHER PEOPLE: NOT DIFFICULT AT ALL
6. BECOMING EASILY ANNOYED OR IRRITABLE: NOT AT ALL
4. TROUBLE RELAXING: NOT AT ALL
5. BEING SO RESTLESS THAT IT IS HARD TO SIT STILL: NOT AT ALL
7. FEELING AFRAID AS IF SOMETHING AWFUL MIGHT HAPPEN: NOT AT ALL

## 2024-12-23 ASSESSMENT — COLUMBIA-SUICIDE SEVERITY RATING SCALE - C-SSRS
6. HAVE YOU EVER DONE ANYTHING, STARTED TO DO ANYTHING, OR PREPARED TO DO ANYTHING TO END YOUR LIFE?: NO
2. HAVE YOU ACTUALLY HAD ANY THOUGHTS OF KILLING YOURSELF?: NO
1. IN THE PAST MONTH, HAVE YOU WISHED YOU WERE DEAD OR WISHED YOU COULD GO TO SLEEP AND NOT WAKE UP?: NO

## 2024-12-23 ASSESSMENT — ENCOUNTER SYMPTOMS
DEPRESSION: 0
LOSS OF SENSATION IN FEET: 0
OCCASIONAL FEELINGS OF UNSTEADINESS: 1

## 2024-12-23 ASSESSMENT — PAIN SCALES - GENERAL: PAINLEVEL_OUTOF10: 6

## 2024-12-23 ASSESSMENT — PAIN DESCRIPTION - DESCRIPTORS: DESCRIPTORS: BURNING;ACHING

## 2024-12-23 ASSESSMENT — PAIN - FUNCTIONAL ASSESSMENT: PAIN_FUNCTIONAL_ASSESSMENT: 0-10

## 2024-12-23 NOTE — PROGRESS NOTES
Chief complaint  Back and lower limbs     History  Abi Weston is back for pain management office visit  She is having difficulty with her hearing and she is going to get hearing aids.  She continue to work.  She had a heavy labor work with cleaning.  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  But she has to continue with work by repeated motion mainly around the back like when   she is free to the floor or when she use the vacuum .  These are maneuvers among others she does at work that increase the pain.  Unfortunately she had no other way around it.  And she has to continue to work.  She continues to have the pain in the back down the lower limbs.  The pain is mechanical worse with movement.  It does affect her activities of daily living.  The pain in the lower limbs is more neuropathic.  In the past she had epidural steroid injection and those helped with episodes of aggravation.    She has been on pain medication for a while.  The pain has been controlled.  I discussed with her our ultimate goal is to keep the pain controlled with the least amount of pain medication for the shortest amount of pain.  I discussed with her to try to cut back on the medication slowly to see where her main pain level is.  Certainly we will continue to assess her and we will adjust her medication accordingly    Pain level without medication is 7/10 , with the medication pain level between 1 and 2/10.     The pain meds are helping control the pain and improving Activities of Daily living and quality of life and quality of sleep.    opioids treatment agreement Feb 2024  Pill count today, using count tray, and in front of patient :  15    pills , last fill was on 11/29  (on the bottley the [facility] did now 11/29 fill for 112 tabs,  the count is correct  [facility] pulled and reviewed, no concerns except did not fill 11/29 fill that is on the bottle  last urine toxicology testing earlier this  "year and it was compliant we will repeat  Xray updated spine  ORT Score is 0  Pain pathology and pain generators spine  Modalities tried injection, surgery, physical therapy, TENS unit, nonsteroidal anti-inflammatory medication multiple interventions with epidural steroid injections    Review of Systems :  Denied any fever or chills. No weight loss and no night sweats. No cough or sputum production. No diarrhea   The constipation has been responding to fibers and over the counter medications.     No bladder and bowel incontinence and no other changes in bladder and bowel. No skin changes.  Reports tiredness and fatigability only if the pain is not controlled.     Denied opioids diversion and abuse and denies alcoholism. Denies overuse of the pain medications.  No reported euphoria sensation or getting a \"high\" on the pain medications.    The control of the pain with the pain medications is helping the control of the symptoms and allowing the function and activities of daily living, enjoyment of life, improving the quality of life and sleep with less interruption by the pain. The goal is symptomatic control of the nonmalignant chronic pain and not to repair the permanent damage in the tissues inducing the chronic pain conditions. We are aiming to shift the focus from the nonmalignant chronic pain to other aspects of life by symptomatically treating this chronic pain. If this pain is not treated it will lead to major morbidity and it is also associated with increased risks of mortality. The patient understands those very clearly and also understand high risks of morbidity and mortality if not strictly adherent to the treatment recommendations and reporting any associated side effects. Also patient understand the full responsibility associated with these medications to avoid abuse or overuse or any use of these medications for anything besides treating the patient's own chronic pain and nothing else under any " circumstances.        Physical examination  Awake, alert and oriented for time place and persons   My nurse  Margi CERVANTES LPN   was present during the entire history and physical examination      She has positive straight leg raising on the left side.  That increase the pain in the back down the lateral aspect of the left leg down to the toe.  Decreased sensory to light touch on the lateral leg.  Big toe extension are 4+/5 bilaterally this is an old finding.. plantar cutaneous reflex are down going bilaterally     Ledy is negative.  No aberrant pain behavior she is walking with a limp but she does not use a cane or assistive devices    Diagnosis  Problem List Items Addressed This Visit       Lumbar herniated disc    Relevant Medications    oxyCODONE-acetaminophen (Percocet)  mg tablet (Start on 12/27/2024)    oxyCODONE-acetaminophen (Percocet)  mg tablet (Start on 1/24/2025)    Degeneration of intervertebral disc of cervical region    Relevant Medications    oxyCODONE-acetaminophen (Percocet)  mg tablet (Start on 12/27/2024)    oxyCODONE-acetaminophen (Percocet)  mg tablet (Start on 1/24/2025)    Degeneration of intervertebral disc of lumbar region    Relevant Medications    oxyCODONE-acetaminophen (Percocet)  mg tablet (Start on 12/27/2024)    oxyCODONE-acetaminophen (Percocet)  mg tablet (Start on 1/24/2025)    Sciatica    Relevant Medications    oxyCODONE-acetaminophen (Percocet)  mg tablet (Start on 12/27/2024)    oxyCODONE-acetaminophen (Percocet)  mg tablet (Start on 1/24/2025)    Annular tear of lumbar disc    Relevant Medications    oxyCODONE-acetaminophen (Percocet)  mg tablet (Start on 12/27/2024)    oxyCODONE-acetaminophen (Percocet)  mg tablet (Start on 1/24/2025)        Plan  Reviewed the pain generators.  Went over the types of pain with neuropathic and nociceptive and different pathologies and therapeutic modalities. Discussed the mechanism  of action of interventions from acupuncture, physical therapy , regular exercises, injections, botox, spinal cord stimulation, and role of surgery     Went over pathology of the intervertebral disc displacement and the anatomical relation to the Nerve roots and relation to the radicular symptoms. Went over treatment modalities with conservative treatment including acupuncture   and epidural steroid injection with fluoroscopy guidance and last resort of surgery    Based on the above findings and the clinical response to the opioids medications and improvement of the activities of daily living, sleep, and work performance. We made this complex decision to continue the opioids therapy in light of the evidence of the patient's responsibility in using the pain medications as prescribed for the nonmalignant chronic pain condition. We discussed about the use of the pain medications to treat the symptoms of chronic nonmalignant pain and we are not trying the repair the permanent damage in the tissues, rather we are trying to control the symptoms induced by the permanent damage to the tissues inducing the chronic pain condition and resulting disability. I explained the difference and discussed it with the patient and stressed the importance of knowing the difference especially because of the potential side effects and the potential addicting effect and habit forming nature of the dangerous drugs we are using to treat the symptoms of the chronic pain.      We discussed that we are prescribing the medications on good laura and legitimate medical reason.     We reviewed the side effects and precautions of opioids prescriptions as discussed in the opioids treatment agreement.    realizes the interaction between the therapeutic classes including the respiratory depression and potential death     Random drug testing   we will submit     At this point I discussed with her about the pain on the back and we will again consider  epidural steroid injection as needed.  But in the meanwhile we will cut back on the medication slowly in order to see if she need a lot of pain medication.  We will certainly continue to monitor her progress.  At this time I will give her prescription for 100 oxycodone for the month.  This will be averaging between 3 and 4 a day instead of 112 tablets for the month.  She is on gabapentin and we will continue with that to help with the sciatica type pain    Discussed about NSAIDS and I explained about the opioids sparing effect to allow keeping the opioids dose at minimal effective dose.   I went over the potential side effects of the NSAIDS on the gastrointestinal, renal and cardiovascular systems.      I detailed the side effects from the acetaminophen in the medication and made aware of those. I also explained about the cumulative effects on the organs and mainly the liver.     Given the opioids therapy , we discussed about the risk for accidental over dose on the pain medications, either for patient or other household. I went over the mechanism of action and mode of use of the Naloxone according to the  recommendations. I will provide a prescription for a kit.     Follow-up 8 weeks or earlier if needed     The level of clinical decision making in this office visit,  is high, given the high risks of complications with the morbidity and mortality due to the fact that acute and chronic pain may pose a threat to life and bodily function, if under treated, poorly treated, or with failure to maintain adequate treatment and timely medical follow up. Additionally over treatment has its own set of complications including overdosing on the pain medications and also the habit forming potentials with the use of the medications used to treat chronic painful conditions including therapeutic classes classified as dangerous medications. Given the serious and fluctuating nature of pain level and instensity with  extensive consideration for whenever pain changes, there is always the risk of prolonged functional impairment requiring close patient monitoring with regular assessments and reassessments and high level medical decision making at every office visit. The amount and complexity of data reviewed is high given the patient clinical presentation, labs,  data, radiology reports, and other tests as discussed during office visits. Pertinent data whether positive or negative were taken in consideration in the process of making this high level medical decision.

## 2025-01-06 ENCOUNTER — TELEPHONE (OUTPATIENT)
Dept: PRIMARY CARE | Facility: CLINIC | Age: 75
End: 2025-01-06
Payer: COMMERCIAL

## 2025-01-06 DIAGNOSIS — I48.20 CHRONIC ATRIAL FIBRILLATION (MULTI): ICD-10-CM

## 2025-01-06 DIAGNOSIS — I10 BENIGN ESSENTIAL HYPERTENSION: Primary | ICD-10-CM

## 2025-01-06 DIAGNOSIS — J43.2 CENTRILOBULAR EMPHYSEMA (MULTI): ICD-10-CM

## 2025-01-06 DIAGNOSIS — F41.8 ANXIETY ASSOCIATED WITH DEPRESSION: ICD-10-CM

## 2025-01-06 RX ORDER — HYDRALAZINE HYDROCHLORIDE 25 MG/1
25 TABLET, FILM COATED ORAL 3 TIMES DAILY
Qty: 90 TABLET | Refills: 0 | Status: SHIPPED | OUTPATIENT
Start: 2025-01-06 | End: 2025-02-05

## 2025-01-06 NOTE — TELEPHONE ENCOUNTER
Patients BP last 3 days has been 185/101, before she left the house today it was 179/109. She has swelling in her ankles due to meds so she was told to stop taking and now her blood pressure wont come down.

## 2025-01-09 RX ORDER — ALBUTEROL SULFATE 90 UG/1
2 INHALANT RESPIRATORY (INHALATION) 2 TIMES DAILY
Qty: 18 G | Refills: 6 | Status: SHIPPED | OUTPATIENT
Start: 2025-01-09

## 2025-01-09 RX ORDER — ESCITALOPRAM OXALATE 20 MG/1
20 TABLET ORAL DAILY
Qty: 90 TABLET | Refills: 2 | Status: SHIPPED | OUTPATIENT
Start: 2025-01-09

## 2025-02-11 ENCOUNTER — TELEPHONE (OUTPATIENT)
Dept: PRIMARY CARE | Facility: CLINIC | Age: 75
End: 2025-02-11
Payer: MEDICARE

## 2025-02-11 DIAGNOSIS — I10 BENIGN ESSENTIAL HYPERTENSION: ICD-10-CM

## 2025-02-14 ENCOUNTER — OFFICE VISIT (OUTPATIENT)
Dept: PRIMARY CARE | Facility: CLINIC | Age: 75
End: 2025-02-14
Payer: MEDICARE

## 2025-02-14 VITALS
HEIGHT: 67 IN | BODY MASS INDEX: 18.21 KG/M2 | DIASTOLIC BLOOD PRESSURE: 90 MMHG | SYSTOLIC BLOOD PRESSURE: 160 MMHG | OXYGEN SATURATION: 96 % | WEIGHT: 116 LBS | TEMPERATURE: 97.2 F | HEART RATE: 68 BPM

## 2025-02-14 DIAGNOSIS — J01.00 ACUTE NON-RECURRENT MAXILLARY SINUSITIS: Primary | ICD-10-CM

## 2025-02-14 PROCEDURE — 1160F RVW MEDS BY RX/DR IN RCRD: CPT | Performed by: FAMILY MEDICINE

## 2025-02-14 PROCEDURE — 1159F MED LIST DOCD IN RCRD: CPT | Performed by: FAMILY MEDICINE

## 2025-02-14 PROCEDURE — 1157F ADVNC CARE PLAN IN RCRD: CPT | Performed by: FAMILY MEDICINE

## 2025-02-14 PROCEDURE — 3080F DIAST BP >= 90 MM HG: CPT | Performed by: FAMILY MEDICINE

## 2025-02-14 PROCEDURE — 99213 OFFICE O/P EST LOW 20 MIN: CPT | Performed by: FAMILY MEDICINE

## 2025-02-14 PROCEDURE — 3077F SYST BP >= 140 MM HG: CPT | Performed by: FAMILY MEDICINE

## 2025-02-14 RX ORDER — AMOXICILLIN AND CLAVULANATE POTASSIUM 500; 125 MG/1; MG/1
500 TABLET, FILM COATED ORAL 3 TIMES DAILY
Qty: 30 TABLET | Refills: 0 | Status: SHIPPED | OUTPATIENT
Start: 2025-02-14 | End: 2025-02-24

## 2025-02-14 RX ORDER — LISINOPRIL 40 MG/1
40 TABLET ORAL DAILY
Qty: 90 TABLET | Refills: 2 | Status: SHIPPED | OUTPATIENT
Start: 2025-02-14 | End: 2026-02-14

## 2025-02-14 NOTE — PROGRESS NOTES
Subjective   Patient ID: Abi Weston is a 75 y.o. female who presents for Sinusitis (Going on for a long time).    Assessment/Plan     Problem List Items Addressed This Visit    None  Visit Diagnoses       Acute non-recurrent maxillary sinusitis    -  Primary    Relevant Medications    amoxicillin-pot clavulanate (Augmentin) 500-125 mg tablet        Rodrigo us back with home bp meds    HPI    75 y old f c/o maxillary sinus pain going on since last 2 wks    With nasal discharge headache    No cp sob    No sore throat      Consider above med  Call us if sx are worsening  Stay hydrated    Allergies   Allergen Reactions   • Cefazolin Hives     Burn hives    red blotches       Current Outpatient Medications   Medication Sig Dispense Refill   • albuterol 90 mcg/actuation inhaler Inhale 2 puffs 2 times a day. 18 g 6   • apixaban (Eliquis) 5 mg tablet Take 1 tablet (5 mg) by mouth 2 times a day. 180 tablet 2   • atorvastatin (Lipitor) 40 mg tablet TAKE 1 TABLET DAILY 90 tablet 2   • biotin 10 mg tablet Take by mouth.     • cholecalciferol (Vitamin D-3) 50 mcg (2,000 unit) capsule Take by mouth.     • escitalopram (Lexapro) 20 mg tablet Take 1 tablet (20 mg) by mouth once daily. 90 tablet 2   • gabapentin (Neurontin) 300 mg capsule Take 1 capsule (300 mg) by mouth 3 times a day. 90 capsule 1   • hydrALAZINE (Apresoline) 25 mg tablet Take 1 tablet (25 mg) by mouth 3 times a day. 90 tablet 0   • levothyroxine (Synthroid, Levoxyl) 75 mcg tablet TAKE 1 TABLET DAILY AS     DIRECTED 90 tablet 2   • lisinopril 10 mg tablet Take 4 tablets (40 mg) by mouth once daily.     • metoprolol succinate XL (Toprol-XL) 25 mg 24 hr tablet TAKE 1 TABLET DAILY 90 tablet 2   • multivitamin with minerals (HAIR,SKIN AND NAILS ORAL) Hair skin and nails supplement     • naloxone (Narcan) 4 mg/0.1 mL nasal spray Administer into affected nostril(s).     • oxyCODONE-acetaminophen (Percocet)  mg tablet Take 1 tablet by mouth every 6 hours if needed  "for severe pain (7 - 10) for up to 28 days. Do not fill before January 24, 2025. 100 tablet 0   • pantoprazole (ProtoNix) 40 mg EC tablet TAKE 1 TABLET DAILY 90 tablet 2   • amLODIPine (Norvasc) 5 mg tablet Take 1 tablet (5 mg) by mouth 2 times daily (morning and late afternoon). Start with 1 tablet daily for once a day for 1 week then 1 tablet p.o. twice daily (Patient not taking: Reported on 2/14/2025) 180 tablet 3   • amoxicillin-pot clavulanate (Augmentin) 500-125 mg tablet Take 1 tablet (500 mg) by mouth 3 times a day for 10 days. 30 tablet 0   • cholestyramine (Questran) 4 gram packet dissolve 1 packet in liquid and drink by mouth daily (Patient not taking: Reported on 2/14/2025)       No current facility-administered medications for this visit.       Objective   Visit Vitals  /90 (BP Location: Left arm, Patient Position: Sitting)   Pulse 68   Temp 36.2 °C (97.2 °F)   Ht 1.7 m (5' 6.93\")   Wt 52.6 kg (116 lb)   SpO2 96%   BMI 18.21 kg/m²   OB Status Unknown   Smoking Status Former   BSA 1.58 m²     Physical Exam  Constitutional:       General: He is not in acute distress.     Appearance: Normal appearance.   HENT:      Head: Normocephalic and atraumatic.      Nose: Nose normal.   Eyes:      Extraocular Movements: Extraocular movements intact.      Conjunctiva/sclera: Conjunctivae normal.   Cardiovascular:      Rate and Rhythm: Normal rate and regular rhythm.   Pulmonary:      Effort: Pulmonary effort is normal.      Breath sounds: Normal breath sounds.   Skin:     General: Skin is warm.   Neurological:      Mental Status: He is alert and oriented to person, place, and time.   Psychiatric:         Mood and Affect: Mood normal.         Behavior: Behavior normal.     B/lmaxillary sinus tenderness more on rt thenleft    Oropharynx - WNL  Immunization History   Administered Date(s) Administered   • Flu vaccine, trivalent, preservative free, HIGH-DOSE, age 65y+ (Fluzone) 09/09/2024   • Moderna COVID-19 " vaccine, bivalent, blue cap/gray label *Check age/dose* 12/20/2022   • Moderna SARS-CoV-2 Vaccination 03/03/2021, 03/31/2021   • Pneumococcal conjugate vaccine, 13-valent (PREVNAR 13) 09/04/2019   • Pneumococcal polysaccharide vaccine, 23-valent, age 2 years and older (PNEUMOVAX 23) 08/29/2014, 02/16/2021   • Td vaccine, age 7 years and older (TDVAX) 10/26/2011, 03/11/2016   • Td vaccine, age 7 years and older (TENIVAC) 03/11/2016   • Zoster vaccine, recombinant, adult (SHINGRIX) 09/04/2019       Review of Systems    Admission on 12/03/2024, Discharged on 12/03/2024   Component Date Value Ref Range Status   • WBC 12/02/2024 6.7  4.4 - 11.3 x10*3/uL Final   • nRBC 12/02/2024 0.0  0.0 - 0.0 /100 WBCs Final   • RBC 12/02/2024 3.95 (L)  4.00 - 5.20 x10*6/uL Final   • Hemoglobin 12/02/2024 11.9 (L)  12.0 - 16.0 g/dL Final   • Hematocrit 12/02/2024 37.2  36.0 - 46.0 % Final   • MCV 12/02/2024 94  80 - 100 fL Final   • MCH 12/02/2024 30.1  26.0 - 34.0 pg Final   • MCHC 12/02/2024 32.0  32.0 - 36.0 g/dL Final   • RDW 12/02/2024 13.5  11.5 - 14.5 % Final   • Platelets 12/02/2024 177  150 - 450 x10*3/uL Final   • Neutrophils % 12/02/2024 47.7  40.0 - 80.0 % Final   • Immature Granulocytes %, Automated 12/02/2024 0.1  0.0 - 0.9 % Final   • Lymphocytes % 12/02/2024 43.1  13.0 - 44.0 % Final   • Monocytes % 12/02/2024 6.5  2.0 - 10.0 % Final   • Eosinophils % 12/02/2024 2.2  0.0 - 6.0 % Final   • Basophils % 12/02/2024 0.4  0.0 - 2.0 % Final   • Neutrophils Absolute 12/02/2024 3.20  1.60 - 5.50 x10*3/uL Final   • Immature Granulocytes Absolute, Au* 12/02/2024 0.01  0.00 - 0.50 x10*3/uL Final   • Lymphocytes Absolute 12/02/2024 2.90  0.80 - 3.00 x10*3/uL Final   • Monocytes Absolute 12/02/2024 0.44  0.05 - 0.80 x10*3/uL Final   • Eosinophils Absolute 12/02/2024 0.15  0.00 - 0.40 x10*3/uL Final   • Basophils Absolute 12/02/2024 0.03  0.00 - 0.10 x10*3/uL Final   • Glucose 12/02/2024 162 (H)  74 - 99 mg/dL Final   • Sodium  12/02/2024 142  136 - 145 mmol/L Final   • Potassium 12/02/2024 3.0 (L)  3.5 - 5.3 mmol/L Final   • Chloride 12/02/2024 103  98 - 107 mmol/L Final   • Bicarbonate 12/02/2024 31  21 - 32 mmol/L Final   • Anion Gap 12/02/2024 11  10 - 20 mmol/L Final   • Urea Nitrogen 12/02/2024 21  6 - 23 mg/dL Final   • Creatinine 12/02/2024 1.02  0.50 - 1.05 mg/dL Final   • eGFR 12/02/2024 57 (L)  >60 mL/min/1.73m*2 Final   • Calcium 12/02/2024 8.3 (L)  8.6 - 10.3 mg/dL Final   • Magnesium 12/02/2024 0.90 (L)  1.60 - 2.40 mg/dL Final   • Ventricular Rate 12/02/2024 63  BPM Final   • QRS Duration 12/02/2024 86  ms Final   • QT Interval 12/02/2024 430  ms Final   • QTC Calculation(Bazett) 12/02/2024 440  ms Final   • R Axis 12/02/2024 84  degrees Final   • T Axis 12/02/2024 62  degrees Final   • QRS Count 12/02/2024 10  beats Final   • Q Onset 12/02/2024 223  ms Final   • T Offset 12/02/2024 438  ms Final   • QTC Fredericia 12/02/2024 437  ms Final   • Ventricular Rate 12/03/2024 68  BPM Final   • QRS Duration 12/03/2024 80  ms Final   • QT Interval 12/03/2024 460  ms Final   • QTC Calculation(Bazett) 12/03/2024 489  ms Final   • R Axis 12/03/2024 81  degrees Final   • T Axis 12/03/2024 75  degrees Final   • QRS Count 12/03/2024 11  beats Final   • Q Onset 12/03/2024 224  ms Final   • T Offset 12/03/2024 454  ms Final   • QTC Fredericia 12/03/2024 479  ms Final       Radiology: Reviewed imaging in powerchart.  No results found.    Family History   Problem Relation Name Age of Onset   • Diabetes Mother     • Hypertension Mother     • Stroke Father       Social History     Socioeconomic History   • Marital status:    Tobacco Use   • Smoking status: Former     Current packs/day: 0.50     Average packs/day: 0.5 packs/day for 30.0 years (15.0 ttl pk-yrs)     Types: Cigarettes   • Smokeless tobacco: Never   • Tobacco comments:     Quit smoking 7wks ago    Substance and Sexual Activity   • Alcohol use: Never   • Drug use: Yes      Types: Codeine     Past Medical History:   Diagnosis Date   • Personal history of other diseases of the circulatory system     History of hypertension     Past Surgical History:   Procedure Laterality Date   • BACK SURGERY  01/23/2018    Back Surgery   • COLON SURGERY  07/09/2014    Colon Surgery   • OTHER SURGICAL HISTORY  02/11/2019    Endoscopy       Charting was completed using voice recognition technology and may include unintended errors.

## 2025-02-14 NOTE — TELEPHONE ENCOUNTER
Patient calling back said she needed to return call to tell karine who prescribed her lisinopril 40 mg- she stated Dr. Aquino was the one who gave her the prescription.

## 2025-02-17 ENCOUNTER — APPOINTMENT (OUTPATIENT)
Dept: PAIN MEDICINE | Facility: CLINIC | Age: 75
End: 2025-02-17
Payer: COMMERCIAL

## 2025-02-17 DIAGNOSIS — M51.369 ANNULAR TEAR OF LUMBAR DISC: ICD-10-CM

## 2025-02-17 DIAGNOSIS — M54.31 BILATERAL SCIATICA: ICD-10-CM

## 2025-02-17 DIAGNOSIS — M54.32 BILATERAL SCIATICA: ICD-10-CM

## 2025-02-17 DIAGNOSIS — M50.30 DEGENERATION OF INTERVERTEBRAL DISC OF CERVICAL REGION: ICD-10-CM

## 2025-02-17 DIAGNOSIS — M96.1 FAILED BACK SYNDROME OF LUMBAR SPINE: Primary | ICD-10-CM

## 2025-02-17 DIAGNOSIS — M51.26 LUMBAR HERNIATED DISC: ICD-10-CM

## 2025-02-17 DIAGNOSIS — M51.369 DEGENERATION OF INTERVERTEBRAL DISC OF LUMBAR REGION: ICD-10-CM

## 2025-02-17 PROCEDURE — 1159F MED LIST DOCD IN RCRD: CPT | Performed by: PHYSICAL MEDICINE & REHABILITATION

## 2025-02-17 PROCEDURE — G2211 COMPLEX E/M VISIT ADD ON: HCPCS | Performed by: PHYSICAL MEDICINE & REHABILITATION

## 2025-02-17 PROCEDURE — 1157F ADVNC CARE PLAN IN RCRD: CPT | Performed by: PHYSICAL MEDICINE & REHABILITATION

## 2025-02-17 PROCEDURE — 99214 OFFICE O/P EST MOD 30 MIN: CPT | Performed by: PHYSICAL MEDICINE & REHABILITATION

## 2025-02-17 RX ORDER — OXYCODONE AND ACETAMINOPHEN 10; 325 MG/1; MG/1
1 TABLET ORAL EVERY 6 HOURS PRN
Qty: 100 TABLET | Refills: 0 | Status: SHIPPED | OUTPATIENT
Start: 2025-03-21 | End: 2025-04-18

## 2025-02-17 RX ORDER — GABAPENTIN 300 MG/1
300 CAPSULE ORAL 3 TIMES DAILY
Qty: 90 CAPSULE | Refills: 1 | Status: SHIPPED | OUTPATIENT
Start: 2025-02-17 | End: 2025-03-19

## 2025-02-17 RX ORDER — OXYCODONE AND ACETAMINOPHEN 10; 325 MG/1; MG/1
1 TABLET ORAL EVERY 6 HOURS PRN
Qty: 100 TABLET | Refills: 0 | Status: SHIPPED | OUTPATIENT
Start: 2025-02-21 | End: 2025-03-21

## 2025-02-17 NOTE — PROGRESS NOTES
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  She could not cut below 100 tablets for the month she is planning on retiring next year.  She would like to keep the amount of pain medication the same for now until she retired.  She needs a pain medication to control the pain to allow her to walk.  Without pain control she is not able to function  She have typical back pain across the back area with radiation to the lower limb the pain in the lower limbs is more neuropathic.  She also take gabapentin for neuropathic pain and that is also helping  Without pain control,The pain is interfering with activities of daily living, quality of life and quality of sleep. It is limiting the functions and everything takes longer to complete because of the slowing related to the pain. Movements are cautious to avoid aggravation of the symptoms.     Long discussion about putting effort in cutting back on pain medications. Discussed about pain level changing and we do not know if the medications at this amount are still needed until we try and cut back slowly on pain medications. If the cut is tolerated then we continue. If cut not tolerated then, will go back on the pain medications level.  The goal from this is to keep the pain medications at the lowest effective dose.      Pain level without medication is 8/10 , with the medication pain level between 2 and 3/10.     Pain disability index improvement by 2 to 3 points, across different functional categories, with the pain control with the meds. Forms filled by patient are scanned in the chart    The pain meds are helping control the pain and improving Activities of Daily living and quality of life and quality of  "sleep.    opioids treatment agreement today     Pill count today, using count tray, and in front of patient :  14    pills , last fill was on 1/24  for 100 tabs,  the count is correct  Oarrs pulled and reviewed, no concerns  last urine toxicology testing was compliant this was done on : October 2024  Xray updated spine  ORT Score is 0  Pain pathology and pain generators spine and sciatica  Modalities tried injection, surgery, physical therapy, TENS unit, nonsteroidal anti-inflammatory medication       Review of Systems :  Denied any fever or chills. No weight loss and no night sweats. No cough or sputum production. No diarrhea   The constipation has been responding to fibers and over the counter medications.     No bladder and bowel incontinence and no other changes in bladder and bowel. No skin changes.  Reports tiredness and fatigability only if the pain is not controlled.     Denied opioids diversion and abuse and denies alcoholism. Denies overuse of the pain medications.  No reported euphoria sensation or getting a \"high\" on the pain medications.    The control of the pain with the pain medications is helping the control of the symptoms and allowing the function and activities of daily living, enjoyment of life, improving the quality of life and sleep with less interruption by the pain. The goal is symptomatic control of the nonmalignant chronic pain and not to repair the permanent damage in the tissues inducing the chronic pain conditions. We are aiming to shift the focus from the nonmalignant chronic pain to other aspects of life by symptomatically treating this chronic pain. If this pain is not treated it will lead to major morbidity and it is also associated with increased risks of mortality. The patient understands those very clearly and also understand high risks of morbidity and mortality if not strictly adherent to the treatment recommendations and reporting any associated side effects. Also patient " understand the full responsibility associated with these medications to avoid abuse or overuse or any use of these medications for anything besides treating the patient's own chronic pain and nothing else under any circumstances.        Physical examination  Awake, alert and oriented for time place and persons     Positive straight leg raise the pain in the back down to the knee plantar cutaneous are downgoing no Ledy testing no cane    Diagnosis  Problem List Items Addressed This Visit       Lumbar herniated disc    Relevant Medications    oxyCODONE-acetaminophen (Percocet)  mg tablet (Start on 2/21/2025)    oxyCODONE-acetaminophen (Percocet)  mg tablet (Start on 3/21/2025)    gabapentin (Neurontin) 300 mg capsule    Degeneration of intervertebral disc of cervical region    Relevant Medications    oxyCODONE-acetaminophen (Percocet)  mg tablet (Start on 2/21/2025)    oxyCODONE-acetaminophen (Percocet)  mg tablet (Start on 3/21/2025)    gabapentin (Neurontin) 300 mg capsule    Degeneration of intervertebral disc of lumbar region    Relevant Medications    oxyCODONE-acetaminophen (Percocet)  mg tablet (Start on 2/21/2025)    oxyCODONE-acetaminophen (Percocet)  mg tablet (Start on 3/21/2025)    gabapentin (Neurontin) 300 mg capsule    Failed back syndrome of lumbar spine - Primary    Sciatica    Relevant Medications    oxyCODONE-acetaminophen (Percocet)  mg tablet (Start on 2/21/2025)    oxyCODONE-acetaminophen (Percocet)  mg tablet (Start on 3/21/2025)    gabapentin (Neurontin) 300 mg capsule    Annular tear of lumbar disc    Relevant Medications    oxyCODONE-acetaminophen (Percocet)  mg tablet (Start on 2/21/2025)    oxyCODONE-acetaminophen (Percocet)  mg tablet (Start on 3/21/2025)    gabapentin (Neurontin) 300 mg capsule        Plan  Reviewed the pain generators.  Went over the types of pain with neuropathic and nociceptive and different pathologies and  therapeutic modalities. Discussed the mechanism of action of interventions from acupuncture, physical therapy , regular exercises, injections, botox, spinal cord stimulation, and role of surgery     Went over pathology of the intervertebral disc displacement and the anatomical relation to the Nerve roots and relation to the radicular symptoms. Went over treatment modalities with conservative treatment including acupuncture   and epidural steroid injection with fluoroscopy guidance and last resort of surgery    Based on the above findings and the clinical response to the opioids medications and improvement of the activities of daily living, sleep, and work performance. We made this complex decision to continue the opioids therapy in light of the evidence of the patient's responsibility in using the pain medications as prescribed for the nonmalignant chronic pain condition. We discussed about the use of the pain medications to treat the symptoms of chronic nonmalignant pain and we are not trying the repair the permanent damage in the tissues, rather we are trying to control the symptoms induced by the permanent damage to the tissues inducing the chronic pain condition and resulting disability. I explained the difference and discussed it with the patient and stressed the importance of knowing the difference especially because of the potential side effects and the potential addicting effect and habit forming nature of the dangerous drugs we are using to treat the symptoms of the chronic pain.      We discussed that we are prescribing the medications on good laura and legitimate medical reason.     We reviewed the side effects and precautions of opioids prescriptions as discussed in the opioids treatment agreement.    realizes the interaction between the therapeutic classes including the respiratory depression and potential death     Random drug testing   we will submit     At this time we will not cut back further we  will keep oxycodone at 100 tablets for the months continue with home exercise program.  We will try cutting back again in the summertime  Oxycodone 10 mg 3 to 4 for the months  Gabapentin 300 tid    Discussed about NSAIDS and I explained about the opioids sparing effect to allow keeping the opioids dose at minimal effective dose.   I went over the potential side effects of the NSAIDS on the gastrointestinal, renal and cardiovascular systems.      I detailed the side effects from the acetaminophen in the medication and made aware of those. I also explained about the cumulative effects on the organs and mainly the liver.     Given the opioids therapy , we discussed about the risk for accidental over dose on the pain medications, either for patient or other household. I went over the mechanism of action and mode of use of the Naloxone according to the  recommendations. I will provide a prescription for a kit.     Follow-up 8 weeks or earlier if needed     The level of clinical decision making in this office visit,  is high, given the high risks of complications with the morbidity and mortality due to the fact that acute and chronic pain may pose a threat to life and bodily function, if under treated, poorly treated, or with failure to maintain adequate treatment and timely medical follow up. Additionally over treatment has its own set of complications including overdosing on the pain medications and also the habit forming potentials with the use of the medications used to treat chronic painful conditions including therapeutic classes classified as dangerous medications. Given the serious and fluctuating nature of pain level and instensity with extensive consideration for whenever pain changes, there is always the risk of prolonged functional impairment requiring close patient monitoring with regular assessments and reassessments and high level medical decision making at every office visit. The amount and  complexity of data reviewed is high given the patient clinical presentation, labs,  data, radiology reports, and other tests as discussed during office visits. Pertinent data whether positive or negative were taken in consideration in the process of making this high level medical decision.

## 2025-02-21 DIAGNOSIS — I10 BENIGN ESSENTIAL HYPERTENSION: ICD-10-CM

## 2025-02-24 RX ORDER — HYDRALAZINE HYDROCHLORIDE 25 MG/1
TABLET, FILM COATED ORAL
Qty: 90 TABLET | Refills: 1 | Status: SHIPPED | OUTPATIENT
Start: 2025-02-24

## 2025-04-10 ENCOUNTER — TELEPHONE (OUTPATIENT)
Dept: PRIMARY CARE | Facility: CLINIC | Age: 75
End: 2025-04-10
Payer: MEDICARE

## 2025-04-14 ENCOUNTER — APPOINTMENT (OUTPATIENT)
Dept: PAIN MEDICINE | Facility: CLINIC | Age: 75
End: 2025-04-14
Payer: MEDICARE

## 2025-04-14 DIAGNOSIS — M51.369 ANNULAR TEAR OF LUMBAR DISC: ICD-10-CM

## 2025-04-14 DIAGNOSIS — M51.369 DEGENERATION OF INTERVERTEBRAL DISC OF LUMBAR REGION: Primary | ICD-10-CM

## 2025-04-14 DIAGNOSIS — M54.32 BILATERAL SCIATICA: ICD-10-CM

## 2025-04-14 DIAGNOSIS — Z79.891 LONG TERM CURRENT USE OF OPIATE ANALGESIC: ICD-10-CM

## 2025-04-14 DIAGNOSIS — M54.31 BILATERAL SCIATICA: ICD-10-CM

## 2025-04-14 DIAGNOSIS — M51.26 LUMBAR HERNIATED DISC: ICD-10-CM

## 2025-04-14 DIAGNOSIS — M50.30 DEGENERATION OF INTERVERTEBRAL DISC OF CERVICAL REGION: ICD-10-CM

## 2025-04-14 PROCEDURE — G2211 COMPLEX E/M VISIT ADD ON: HCPCS | Performed by: PHYSICAL MEDICINE & REHABILITATION

## 2025-04-14 PROCEDURE — 1159F MED LIST DOCD IN RCRD: CPT | Performed by: PHYSICAL MEDICINE & REHABILITATION

## 2025-04-14 PROCEDURE — 1157F ADVNC CARE PLAN IN RCRD: CPT | Performed by: PHYSICAL MEDICINE & REHABILITATION

## 2025-04-14 PROCEDURE — 99214 OFFICE O/P EST MOD 30 MIN: CPT | Performed by: PHYSICAL MEDICINE & REHABILITATION

## 2025-04-14 RX ORDER — TIZANIDINE 2 MG/1
2 TABLET ORAL EVERY 12 HOURS PRN
Qty: 60 TABLET | Refills: 1 | Status: SHIPPED | OUTPATIENT
Start: 2025-04-14 | End: 2025-05-14

## 2025-04-14 RX ORDER — OXYCODONE AND ACETAMINOPHEN 10; 325 MG/1; MG/1
1 TABLET ORAL EVERY 6 HOURS PRN
Qty: 100 TABLET | Refills: 0 | Status: SHIPPED | OUTPATIENT
Start: 2025-05-16 | End: 2025-06-13

## 2025-04-14 RX ORDER — OXYCODONE AND ACETAMINOPHEN 10; 325 MG/1; MG/1
1 TABLET ORAL EVERY 6 HOURS PRN
Qty: 100 TABLET | Refills: 0 | Status: SHIPPED | OUTPATIENT
Start: 2025-04-18 | End: 2025-05-16

## 2025-04-14 RX ORDER — GABAPENTIN 300 MG/1
300 CAPSULE ORAL 3 TIMES DAILY
Qty: 90 CAPSULE | Refills: 1 | Status: SHIPPED | OUTPATIENT
Start: 2025-04-14 | End: 2025-05-14

## 2025-04-14 NOTE — PROGRESS NOTES
Chief complaint  Pain in the back and lower limbs right shoulder pain     Margi CERVANTES LPN,   was present during the entire history and physical examination    History  Abi Weston is back for pain management office visit  Having aggravation  of back pain and legs . In the past insurance denied STEVE!!  Also now having R shoulder advance OA and needs TSR    Detailed discussion and explanation about the need to try  and cut back on pain medications.  Entertained question about   pain level changing from acute, subacute to chronic pain.  Currently the pain and chronic.  The higher amount of medication were for the acute and subacute phase.  Therefore   we do not know exactly if the medications at this amount are still needed until we try and cut back slowly on pain medications. If the cut is tolerated then we continue trying to cut back further. If cut not tolerated then, will try additional none chemical methods like injection or physical therapy or we can go back on the pain medications level.  The goal from this is to keep the pain medications at the lowest effective dose and to control the tolerance that develops from the chronic use of opioid therapy.  Our goal is to keep the pain controlled with minimal effective dose.  That will help cutting back on the potential for side effects, and also will help decrease the amount of tolerance developing from the chronic use of opioid medication.    In addition to the above, we discussed about emerging data about tapering opioid therapy for chronic nonmalignant pain.  These are showing increasing evidence of improving the chronic pain itself, anxiety and depression, with the tapering of opioid therapy for chronic nonmalignant pain.  These are additional benefits to the above that we have been discussing.      As above she had multiple pain generators the last epidural injection for the sciatica was denied by the insurance.  She is due for right total shoulder replacement but  is afraid from the time that she have to be out of work.  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     Pain disability index (PDI) improvement by 2-4 points, across different functional categories, with the pain control with the meds. Forms filled by patient are scanned in the chart    The pain meds are helping control the pain and improving Activities of Daily living and quality of life and quality of sleep.    opioids treatment agreement Jan 2025    Pill count today, using count tray, and in front of patient, Nurse and myself :  12    pills , last fill was on 3/21  for 100 tabs,  the meds pill count today is correct  Oarrs pulled and reviewed, no concerns  last urine toxicology testing was compliant this was done on : October 2024  Xray updated spine and shoulder   ORT (opioid risk tool) score is  0  Pain pathology and pain generators spine   Modalities tried injection, surgery, physical therapy, TENS unit, nonsteroidal anti-inflammatory medication       Review of Systems :  Denied any fever or chills. No weight loss and no night sweats. No cough or sputum production. No diarrhea   The constipation has been responding to fibers and over the counter medications.     No bladder and bowel incontinence and no other changes in bladder and bowel. No skin changes.  Reports tiredness and fatigability only if the pain is not controlled.     I further Asked about symptoms or changes affecting the vision, hearing, breathing, digestive system, urinary symptoms, skin, other musculoskeletal condition, neurological conditions these are negative except as detailed in the history and physical examination  "above    Denied opioids diversion and abuse and denies alcoholism. Denies overuse of the pain medications.  No reported euphoria sensation or getting a \"high\" on the pain medications.    The control of the pain with the pain medications is helping the control of the symptoms and allowing the function and activities of daily living, enjoyment of life, improving the quality of life and sleep with less interruption by the pain. The goal is symptomatic control of the nonmalignant chronic pain and not to repair the permanent damage in the tissues inducing the chronic pain conditions. We are aiming to shift the focus from the nonmalignant chronic pain to other aspects of life by symptomatically treating this chronic pain. If this pain is not treated it will lead to major morbidity and it is also associated with increased risks of mortality. The patient understands those very clearly and also understand high risks of morbidity and mortality if not strictly adherent to the treatment recommendations and reporting any associated side effects. Also patient understand the full responsibility associated with these medications to avoid abuse or overuse or any use of these medications for anything besides treating the patient's own chronic pain and nothing else under any circumstances.        Physical examination  Awake, alert and oriented for time place and persons   Pupils are equal and reactive to light and accommodation    Right shoulder impingement and limited range of motion to 80 degrees in flexion and abduction she could only reach L4 on the right on the left she could reach L 1 of note that the left shoulder is also impinging but to a lesser degree  Reversal of lumbar lordosis walking with antalgic gait no aberrant pain behavior Ledy testing are negative straight leg raising are positive at 60 degrees bilaterally.  Plantar cutaneous are downgoing.    Diagnosis  Problem List Items Addressed This Visit       Lumbar " herniated disc    Relevant Medications    gabapentin (Neurontin) 300 mg capsule    oxyCODONE-acetaminophen (Percocet)  mg tablet (Start on 4/18/2025)    oxyCODONE-acetaminophen (Percocet)  mg tablet (Start on 5/16/2025)    tiZANidine (Zanaflex) 2 mg tablet    Degeneration of intervertebral disc of cervical region    Relevant Medications    gabapentin (Neurontin) 300 mg capsule    oxyCODONE-acetaminophen (Percocet)  mg tablet (Start on 4/18/2025)    oxyCODONE-acetaminophen (Percocet)  mg tablet (Start on 5/16/2025)    tiZANidine (Zanaflex) 2 mg tablet    Degeneration of intervertebral disc of lumbar region - Primary    Relevant Medications    gabapentin (Neurontin) 300 mg capsule    oxyCODONE-acetaminophen (Percocet)  mg tablet (Start on 4/18/2025)    oxyCODONE-acetaminophen (Percocet)  mg tablet (Start on 5/16/2025)    tiZANidine (Zanaflex) 2 mg tablet    Sciatica    Relevant Medications    gabapentin (Neurontin) 300 mg capsule    oxyCODONE-acetaminophen (Percocet)  mg tablet (Start on 4/18/2025)    oxyCODONE-acetaminophen (Percocet)  mg tablet (Start on 5/16/2025)    tiZANidine (Zanaflex) 2 mg tablet    Annular tear of lumbar disc    Relevant Medications    gabapentin (Neurontin) 300 mg capsule    oxyCODONE-acetaminophen (Percocet)  mg tablet (Start on 4/18/2025)    oxyCODONE-acetaminophen (Percocet)  mg tablet (Start on 5/16/2025)    tiZANidine (Zanaflex) 2 mg tablet     Other Visit Diagnoses       Long term current use of opiate analgesic        Relevant Orders    Opiate/Opioid/Benzo Prescription Compliance             Plan  Reviewed the pain generators.  Went over the types of pain with neuropathic and nociceptive and different pathologies and therapeutic modalities. Discussed the mechanism of action of interventions from acupuncture, physical therapy , regular exercises, injections, botox, spinal cord stimulation, and role of surgery     Went over  pathology of the intervertebral disc displacement and the anatomical relation to the Nerve roots and relation to the radicular symptoms. Went over treatment modalities with conservative treatment including acupuncture   and epidural steroid injection with fluoroscopy guidance and last resort of surgery    Based on the above findings and the clinical response to the opioids medications and improvement of the activities of daily living, sleep, and work performance. We made this complex decision to continue the opioids therapy in light of the evidence of the patient's responsibility in using the pain medications as prescribed for the nonmalignant chronic pain condition. We discussed about the use of the pain medications to treat the symptoms of chronic nonmalignant pain and we are not trying the repair the permanent damage in the tissues, rather we are trying to control the symptoms induced by the permanent damage to the tissues inducing the chronic pain condition and resulting disability. I explained the difference and discussed it with the patient and stressed the importance of knowing the difference especially because of the potential side effects and the potential addicting effect and habit forming nature of the dangerous drugs we are using to treat the symptoms of the chronic pain.      We discussed that we are prescribing the medications on good laura and legitimate medical reason within the scope of professional medical practice.     We reviewed the side effects and precautions of opioids prescriptions as discussed in the opioids treatment agreement.    realizes the interaction between the therapeutic classes including the respiratory depression and potential death     Random drug testing   we will submit     As we discussed above we will continue with home exercise program injections were declined.  For the shoulder is advanced and she is due for total shoulder replacement I did not think any interventional  pain modality would help her at this point  Added tizanidine 2 mg bid for spasms  Continue with jamal for neuropathic pain   Oxycodone as needed for pain 100 tabs for 28 days    Discussed about NSAIDS and I explained about the opioids sparing effect to allow keeping the opioids dose at minimal effective dose.   I went over the potential side effects of the NSAIDS on the gastrointestinal, renal and cardiovascular systems.      I detailed the side effects from the acetaminophen in the medication and made aware of those. I also explained about the cumulative effects on the organs and mainly the liver.     Given the opioids therapy , we discussed about the risk for accidental over dose on the pain medications, either for patient or other household. I went over the mechanism of action and mode of use of the Naloxone according to the  recommendations. I will provide a prescription for a kit.     Follow-up 8 weeks or earlier if needed     The level of clinical decision making in this office visit,  is high, given the high risks of complications with the morbidity and mortality due to the fact that acute and chronic pain may pose a threat to life and bodily function, if under treated, poorly treated, or with failure to maintain adequate treatment and timely medical follow up. Additionally over treatment has its own set of complications including overdosing on the pain medications and also the habit forming potentials with the use of the medications used to treat chronic painful conditions including therapeutic classes classified as dangerous medications. Given the serious and fluctuating nature of pain level and instensity with extensive consideration for whenever pain changes, there is always the risk of prolonged functional impairment requiring close patient monitoring with regular assessments and reassessments and high level medical decision making at every office visit. The amount and complexity of data  reviewed is high given the patient clinical presentation, labs,  data, radiology reports, and other tests as discussed during office visits. Pertinent data whether positive or negative were taken in consideration in the process of making this high level medical decision.

## 2025-04-15 ENCOUNTER — OFFICE VISIT (OUTPATIENT)
Dept: PRIMARY CARE | Facility: CLINIC | Age: 75
End: 2025-04-15
Payer: MEDICARE

## 2025-04-15 ENCOUNTER — TELEPHONE (OUTPATIENT)
Dept: PRIMARY CARE | Facility: CLINIC | Age: 75
End: 2025-04-15

## 2025-04-15 VITALS
TEMPERATURE: 97.9 F | HEART RATE: 63 BPM | DIASTOLIC BLOOD PRESSURE: 60 MMHG | WEIGHT: 118 LBS | BODY MASS INDEX: 18.52 KG/M2 | HEIGHT: 67 IN | SYSTOLIC BLOOD PRESSURE: 110 MMHG | OXYGEN SATURATION: 87 %

## 2025-04-15 DIAGNOSIS — J96.11 CHRONIC RESPIRATORY FAILURE WITH HYPOXIA: ICD-10-CM

## 2025-04-15 DIAGNOSIS — R60.0 PERIPHERAL EDEMA: Primary | ICD-10-CM

## 2025-04-15 DIAGNOSIS — I10 BENIGN ESSENTIAL HYPERTENSION: ICD-10-CM

## 2025-04-15 DIAGNOSIS — J43.2 CENTRILOBULAR EMPHYSEMA (MULTI): ICD-10-CM

## 2025-04-15 DIAGNOSIS — I48.20 CHRONIC ATRIAL FIBRILLATION (MULTI): ICD-10-CM

## 2025-04-15 DIAGNOSIS — I50.32 CHRONIC DIASTOLIC CONGESTIVE HEART FAILURE: ICD-10-CM

## 2025-04-15 PROBLEM — N18.31 CHRONIC KIDNEY DISEASE, STAGE 3A (MULTI): Status: ACTIVE | Noted: 2025-04-15

## 2025-04-15 PROCEDURE — 1157F ADVNC CARE PLAN IN RCRD: CPT | Performed by: FAMILY MEDICINE

## 2025-04-15 PROCEDURE — 1159F MED LIST DOCD IN RCRD: CPT | Performed by: FAMILY MEDICINE

## 2025-04-15 PROCEDURE — 99214 OFFICE O/P EST MOD 30 MIN: CPT | Performed by: FAMILY MEDICINE

## 2025-04-15 PROCEDURE — 3074F SYST BP LT 130 MM HG: CPT | Performed by: FAMILY MEDICINE

## 2025-04-15 PROCEDURE — 3078F DIAST BP <80 MM HG: CPT | Performed by: FAMILY MEDICINE

## 2025-04-15 PROCEDURE — 1160F RVW MEDS BY RX/DR IN RCRD: CPT | Performed by: FAMILY MEDICINE

## 2025-04-15 RX ORDER — FUROSEMIDE 20 MG/1
20 TABLET ORAL DAILY
Qty: 30 TABLET | Refills: 0 | Status: SHIPPED | OUTPATIENT
Start: 2025-04-15 | End: 2025-05-15

## 2025-04-15 NOTE — PROGRESS NOTES
Subjective   Patient ID: Abi Weston is a 75 y.o. female who presents for Leg Swelling (For 2 wks).    Assessment/Plan     Problem List Items Addressed This Visit       Benign essential hypertension    Relevant Orders    Basic Metabolic Panel    CBC    B-type natriuretic peptide    Chronic atrial fibrillation (Multi)    Diastolic CHF (Multi)    Relevant Medications    furosemide (Lasix) 20 mg tablet    Other Relevant Orders    Basic Metabolic Panel    CBC    B-type natriuretic peptide    Chronic obstructive pulmonary disease (Multi)    Peripheral edema - Primary    Relevant Medications    furosemide (Lasix) 20 mg tablet    Chronic respiratory failure with hypoxia       Time spent around 10 minutes obtaining and discussing depression screening using PHQ 9 questions with results documented in the chart    Continue current medication    Follow-up with cardiology  Advised to stay away from smoking  Continue atorvastatin 40 mg  Follow-up in 4 months  Mammogram April 2022 within normal limits  Scan showed osteopenia in April 2022  Declined flu vaccine      Up-to-date with pneumonia vaccine  New shingles vaccine patient declined for now    Follow-up in 6 months  Come back early with any new sign and symptoms patient understands and agreement plan.     HPI    74-year-old female here for follow-up on blood pressure    Started amlodipine complaining of lower extremity swelling significant  Will stop amlodipine  Monitor blood pressure at home we may consider increasing hydralazine today blood pressure is okay  Will consider lab work  Follow-up in couple of weeks we will check lab work at that time as well        Patient had a colonoscopy done with diarrhea no significant abnormality done in August 2024    Shoulder osteoarthritis recently received steroid injection        Still on and off smoking      Using o2 only at night advised to use oxygen at home    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 Rossi  Status post lumbar spinal fusion and decompression - was seen by neurosurgery Dr. Ramos - history of spinal surgery Ã--2 - following up with pain management  Chronic pain syndrome - lower back pain - following up with pain management  Osteoarthritis  Status post right carpal tunnel surgery feeling better  GERD  Diastolic CHF      Chlorthalidone was stopped secondary to hypokalemia hypomagnesemia     Started smoking again advised patient to stop smoking  Following up with cardiology and pain management  Still smoking       Complaining of weight loss we will consider low-dose of Remeron 7.5 mg risk-benefit discussed  Advance directives:. Advanced Care Planning discussed and documented advance care plan or surrogate decision maker documented in the medical record.   A Conversation about Advance directives of at least 16 minutes has occurred. Actual time spent: I spent >16 minutes discussing Advance Care Planning including the explanation and discussion of advance directives. If patient does not have current up to date documents, examples and information provided on how to create both Living Will and Power of . Patient was encouraged to work on completing these documents.  Conversation with: The conversation with the patient and/or participants was voluntary.  Documents discussed and/or completed: Living Will was discussed with participants. Healthcare POA was discussed with participants. Patient educated on how to obtain Living Will and Power of . Discussed patient end of life choices.   Patient has living will and patient's daughter liudmila is healthcare power of .  Patient is full code.      Allergies   Allergen Reactions   • Cefazolin Hives     Burn hives    red  ross       Current Outpatient Medications   Medication Sig Dispense Refill   • albuterol 90 mcg/actuation inhaler Inhale 2 puffs 2 times a day. 18 g 6   • apixaban (Eliquis) 5 mg tablet Take 1 tablet (5 mg) by mouth 2 times a day. 180 tablet 2   • atorvastatin (Lipitor) 40 mg tablet TAKE 1 TABLET DAILY 90 tablet 2   • cholecalciferol (Vitamin D-3) 50 mcg (2,000 unit) capsule Take by mouth.     • escitalopram (Lexapro) 20 mg tablet Take 1 tablet (20 mg) by mouth once daily. 90 tablet 2   • gabapentin (Neurontin) 300 mg capsule Take 1 capsule (300 mg) by mouth 3 times a day. 90 capsule 1   • hydrALAZINE (Apresoline) 25 mg tablet TAKE ONE TABLET (25 mg) BY MOUTH THREE TIMES A DAY 90 tablet 1   • levothyroxine (Synthroid, Levoxyl) 75 mcg tablet TAKE 1 TABLET DAILY AS     DIRECTED 90 tablet 2   • lisinopril 40 mg tablet Take 1 tablet (40 mg) by mouth once daily. 90 tablet 2   • metoprolol succinate XL (Toprol-XL) 25 mg 24 hr tablet TAKE 1 TABLET DAILY 90 tablet 2   • multivitamin with minerals (HAIR,SKIN AND NAILS ORAL) Hair skin and nails supplement     • naloxone (Narcan) 4 mg/0.1 mL nasal spray Administer into affected nostril(s).     • [START ON 4/18/2025] oxyCODONE-acetaminophen (Percocet)  mg tablet Take 1 tablet by mouth every 6 hours if needed for severe pain (7 - 10) for up to 28 days. 100 tabs fro 28 days. Do not fill before April 18, 2025. 100 tablet 0   • [START ON 5/16/2025] oxyCODONE-acetaminophen (Percocet)  mg tablet Take 1 tablet by mouth every 6 hours if needed for severe pain (7 - 10) for up to 28 days. 100 tabs fro 28 days. Do not fill before May 16, 2025. 100 tablet 0   • pantoprazole (ProtoNix) 40 mg EC tablet TAKE 1 TABLET DAILY 90 tablet 2   • furosemide (Lasix) 20 mg tablet Take 1 tablet (20 mg) by mouth once daily. 30 tablet 0   • tiZANidine (Zanaflex) 2 mg tablet Take 1 tablet (2 mg) by mouth every 12 hours if needed for muscle spasms. (Patient not taking: Reported on 4/15/2025)  "60 tablet 1     No current facility-administered medications for this visit.       Objective   Visit Vitals  /60 (BP Location: Left arm, Patient Position: Sitting)   Pulse 63   Temp 36.6 °C (97.9 °F)   Ht 1.7 m (5' 6.93\")   Wt 53.5 kg (118 lb)   SpO2 (!) 87%   BMI 18.52 kg/m²   OB Status Unknown   Smoking Status Some Days   BSA 1.59 m²     Physical Exam  Constitutional:       General: He is not in acute distress.     Appearance: Normal appearance.   HENT:      Head: Normocephalic and atraumatic.      Nose: Nose normal.   Eyes:      Extraocular Movements: Extraocular movements intact.      Conjunctiva/sclera: Conjunctivae normal.   Cardiovascular:      Rate and Rhythm: Normal rate and regular rhythm.   Pulmonary:      Effort: Pulmonary effort is normal.      Breath sounds: Normal breath sounds.   Skin:     General: Skin is warm.   Neurological:      Mental Status: He is alert and oriented to person, place, and time.   Psychiatric:         Mood and Affect: Mood normal.         Behavior: Behavior normal.   Immunization History   Administered Date(s) Administered   • Flu vaccine, trivalent, preservative free, HIGH-DOSE, age 65y+ (Fluzone) 09/09/2024   • Moderna COVID-19 vaccine, bivalent, blue cap/gray label *Check age/dose* 12/20/2022   • Moderna SARS-CoV-2 Vaccination 03/03/2021, 03/31/2021   • Pneumococcal conjugate vaccine, 13-valent (PREVNAR 13) 09/04/2019   • Pneumococcal polysaccharide vaccine, 23-valent, age 2 years and older (PNEUMOVAX 23) 08/29/2014, 02/16/2021   • Td vaccine, age 7 years and older (TDVAX) 10/26/2011, 03/11/2016   • Td vaccine, age 7 years and older (TENIVAC) 03/11/2016   • Zoster vaccine, recombinant, adult (SHINGRIX) 09/04/2019       Review of Systems    No visits with results within 4 Month(s) from this visit.   Latest known visit with results is:   Admission on 12/03/2024, Discharged on 12/03/2024   Component Date Value Ref Range Status   • WBC 12/02/2024 6.7  4.4 - 11.3 x10*3/uL " Final   • nRBC 12/02/2024 0.0  0.0 - 0.0 /100 WBCs Final   • RBC 12/02/2024 3.95 (L)  4.00 - 5.20 x10*6/uL Final   • Hemoglobin 12/02/2024 11.9 (L)  12.0 - 16.0 g/dL Final   • Hematocrit 12/02/2024 37.2  36.0 - 46.0 % Final   • MCV 12/02/2024 94  80 - 100 fL Final   • MCH 12/02/2024 30.1  26.0 - 34.0 pg Final   • MCHC 12/02/2024 32.0  32.0 - 36.0 g/dL Final   • RDW 12/02/2024 13.5  11.5 - 14.5 % Final   • Platelets 12/02/2024 177  150 - 450 x10*3/uL Final   • Neutrophils % 12/02/2024 47.7  40.0 - 80.0 % Final   • Immature Granulocytes %, Automated 12/02/2024 0.1  0.0 - 0.9 % Final   • Lymphocytes % 12/02/2024 43.1  13.0 - 44.0 % Final   • Monocytes % 12/02/2024 6.5  2.0 - 10.0 % Final   • Eosinophils % 12/02/2024 2.2  0.0 - 6.0 % Final   • Basophils % 12/02/2024 0.4  0.0 - 2.0 % Final   • Neutrophils Absolute 12/02/2024 3.20  1.60 - 5.50 x10*3/uL Final   • Immature Granulocytes Absolute, Au* 12/02/2024 0.01  0.00 - 0.50 x10*3/uL Final   • Lymphocytes Absolute 12/02/2024 2.90  0.80 - 3.00 x10*3/uL Final   • Monocytes Absolute 12/02/2024 0.44  0.05 - 0.80 x10*3/uL Final   • Eosinophils Absolute 12/02/2024 0.15  0.00 - 0.40 x10*3/uL Final   • Basophils Absolute 12/02/2024 0.03  0.00 - 0.10 x10*3/uL Final   • Glucose 12/02/2024 162 (H)  74 - 99 mg/dL Final   • Sodium 12/02/2024 142  136 - 145 mmol/L Final   • Potassium 12/02/2024 3.0 (L)  3.5 - 5.3 mmol/L Final   • Chloride 12/02/2024 103  98 - 107 mmol/L Final   • Bicarbonate 12/02/2024 31  21 - 32 mmol/L Final   • Anion Gap 12/02/2024 11  10 - 20 mmol/L Final   • Urea Nitrogen 12/02/2024 21  6 - 23 mg/dL Final   • Creatinine 12/02/2024 1.02  0.50 - 1.05 mg/dL Final   • eGFR 12/02/2024 57 (L)  >60 mL/min/1.73m*2 Final   • Calcium 12/02/2024 8.3 (L)  8.6 - 10.3 mg/dL Final   • Magnesium 12/02/2024 0.90 (L)  1.60 - 2.40 mg/dL Final   • Ventricular Rate 12/02/2024 63  BPM Final   • QRS Duration 12/02/2024 86  ms Final   • QT Interval 12/02/2024 430  ms Final   • QTC  Calculation(Bazett) 12/02/2024 440  ms Final   • R Axis 12/02/2024 84  degrees Final   • T Axis 12/02/2024 62  degrees Final   • QRS Count 12/02/2024 10  beats Final   • Q Onset 12/02/2024 223  ms Final   • T Offset 12/02/2024 438  ms Final   • QTC Fredericia 12/02/2024 437  ms Final   • Ventricular Rate 12/03/2024 68  BPM Final   • QRS Duration 12/03/2024 80  ms Final   • QT Interval 12/03/2024 460  ms Final   • QTC Calculation(Bazett) 12/03/2024 489  ms Final   • R Axis 12/03/2024 81  degrees Final   • T Axis 12/03/2024 75  degrees Final   • QRS Count 12/03/2024 11  beats Final   • Q Onset 12/03/2024 224  ms Final   • T Offset 12/03/2024 454  ms Final   • QTC Fredericia 12/03/2024 479  ms Final       Radiology: Reviewed imaging in powerchart.  No results found.    Family History   Problem Relation Name Age of Onset   • Diabetes Mother     • Hypertension Mother     • Stroke Father       Social History     Socioeconomic History   • Marital status:    Tobacco Use   • Smoking status: Some Days     Current packs/day: 0.50     Average packs/day: 0.5 packs/day for 30.0 years (15.0 ttl pk-yrs)     Types: Cigarettes   • Smokeless tobacco: Never   • Tobacco comments:     Smoking here and there again but not every day   Substance and Sexual Activity   • Alcohol use: Never   • Drug use: Yes     Types: Codeine   • Sexual activity: Defer     Past Medical History:   Diagnosis Date   • Personal history of other diseases of the circulatory system     History of hypertension     Past Surgical History:   Procedure Laterality Date   • BACK SURGERY  01/23/2018    Back Surgery   • COLON SURGERY  07/09/2014    Colon Surgery   • OTHER SURGICAL HISTORY  02/11/2019    Endoscopy       Charting was completed using voice recognition technology and may include unintended errors.

## 2025-04-15 NOTE — LETTER
April 15, 2025     Patient: Abi Weston   YOB: 1950   Date of Visit: 4/15/2025       To Whom It May Concern:    Abi Weston was seen in my clinic on 4/15/2025 at 9:15 am. Please excuse Abi for her absence from work on this day to make the appointment.    If you have any questions or concerns, please don't hesitate to call.         Sincerely,         Jhonathan Aquino MD        CC: No Recipients

## 2025-04-29 ENCOUNTER — APPOINTMENT (OUTPATIENT)
Dept: PRIMARY CARE | Facility: CLINIC | Age: 75
End: 2025-04-29
Payer: MEDICARE

## 2025-05-05 ENCOUNTER — APPOINTMENT (OUTPATIENT)
Dept: PRIMARY CARE | Facility: CLINIC | Age: 75
End: 2025-05-05
Payer: MEDICARE

## 2025-05-05 VITALS
WEIGHT: 113 LBS | OXYGEN SATURATION: 91 % | HEART RATE: 70 BPM | TEMPERATURE: 97.8 F | DIASTOLIC BLOOD PRESSURE: 70 MMHG | BODY MASS INDEX: 17.74 KG/M2 | SYSTOLIC BLOOD PRESSURE: 110 MMHG | HEIGHT: 67 IN

## 2025-05-05 DIAGNOSIS — R60.0 PERIPHERAL EDEMA: ICD-10-CM

## 2025-05-05 DIAGNOSIS — I50.32 CHRONIC DIASTOLIC CONGESTIVE HEART FAILURE: Primary | ICD-10-CM

## 2025-05-05 PROCEDURE — 1159F MED LIST DOCD IN RCRD: CPT | Performed by: FAMILY MEDICINE

## 2025-05-05 PROCEDURE — 3078F DIAST BP <80 MM HG: CPT | Performed by: FAMILY MEDICINE

## 2025-05-05 PROCEDURE — 1160F RVW MEDS BY RX/DR IN RCRD: CPT | Performed by: FAMILY MEDICINE

## 2025-05-05 PROCEDURE — 99213 OFFICE O/P EST LOW 20 MIN: CPT | Performed by: FAMILY MEDICINE

## 2025-05-05 PROCEDURE — 1157F ADVNC CARE PLAN IN RCRD: CPT | Performed by: FAMILY MEDICINE

## 2025-05-05 PROCEDURE — 3074F SYST BP LT 130 MM HG: CPT | Performed by: FAMILY MEDICINE

## 2025-05-05 NOTE — PROGRESS NOTES
Subjective   Patient ID: Abi Weston is a 75 y.o. female who presents for No chief complaint on file..    Assessment/Plan     Problem List Items Addressed This Visit    None        Time spent around 10 minutes obtaining and discussing depression screening using PHQ 9 questions with results documented in the chart    Continue current medication    Follow-up with cardiology  Advised to stay away from smoking  Continue atorvastatin 40 mg  Follow-up in 4 months  Mammogram April 2022 within normal limits  Scan showed osteopenia in April 2022  Declined flu vaccine      Up-to-date with pneumonia vaccine  New shingles vaccine patient declined for now    Follow-up in 6 months  Come back early with any new sign and symptoms patient understands and agreement plan.     HPI    74-year-old female here for follow-up on blood pressure    And peripheral edema  Amlodipine was stopped  Blood pressure is well-controlled without amlodipine  Taking Lasix every day  Will check BMP magnesium  Elevated BNP will consider echocardiogram  Will call patient abnormal result    We will decide Lasix dosing after lab work      Otherwise follow-up in 3 months  Patient agrees  Consider wellness on next visit    Previous visit        Patient had a colonoscopy done with diarrhea no significant abnormality done in August 2024    Shoulder osteoarthritis recently received steroid injection        Still on and off smoking      Using o2 only at night advised to use oxygen at home    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 Rossi  Status post lumbar spinal fusion and decompression - was seen by neurosurgery Dr. Ramos - history of spinal surgery Ã--2 - following up with pain management  Chronic pain syndrome  - lower back pain - following up with pain management  Osteoarthritis  Status post right carpal tunnel surgery feeling better  GERD  Diastolic CHF      Chlorthalidone was stopped secondary to hypokalemia hypomagnesemia     Started smoking again advised patient to stop smoking  Following up with cardiology and pain management  Still smoking       Complaining of weight loss we will consider low-dose of Remeron 7.5 mg risk-benefit discussed  Advance directives:. Advanced Care Planning discussed and documented advance care plan or surrogate decision maker documented in the medical record.   A Conversation about Advance directives of at least 16 minutes has occurred. Actual time spent: I spent >16 minutes discussing Advance Care Planning including the explanation and discussion of advance directives. If patient does not have current up to date documents, examples and information provided on how to create both Living Will and Power of . Patient was encouraged to work on completing these documents.  Conversation with: The conversation with the patient and/or participants was voluntary.  Documents discussed and/or completed: Living Will was discussed with participants. Healthcare POA was discussed with participants. Patient educated on how to obtain Living Will and Power of . Discussed patient end of life choices.   Patient has living will and patient's daughter liudmila is healthcare power of .  Patient is full code.      Allergies   Allergen Reactions    Cefazolin Hives     Burn hives    red blotches       Current Outpatient Medications   Medication Sig Dispense Refill    albuterol 90 mcg/actuation inhaler Inhale 2 puffs 2 times a day. 18 g 6    apixaban (Eliquis) 5 mg tablet Take 1 tablet (5 mg) by mouth 2 times a day. 180 tablet 2    atorvastatin (Lipitor) 40 mg tablet TAKE 1 TABLET DAILY 90 tablet 2    cholecalciferol (Vitamin D-3) 50 mcg (2,000 unit) capsule Take by mouth.      escitalopram  (Lexapro) 20 mg tablet Take 1 tablet (20 mg) by mouth once daily. 90 tablet 2    furosemide (Lasix) 20 mg tablet Take 1 tablet (20 mg) by mouth once daily. 30 tablet 0    gabapentin (Neurontin) 300 mg capsule Take 1 capsule (300 mg) by mouth 3 times a day. 90 capsule 1    hydrALAZINE (Apresoline) 25 mg tablet TAKE ONE TABLET (25 mg) BY MOUTH THREE TIMES A DAY 90 tablet 1    levothyroxine (Synthroid, Levoxyl) 75 mcg tablet TAKE 1 TABLET DAILY AS     DIRECTED 90 tablet 2    lisinopril 40 mg tablet Take 1 tablet (40 mg) by mouth once daily. 90 tablet 2    metoprolol succinate XL (Toprol-XL) 25 mg 24 hr tablet TAKE 1 TABLET DAILY 90 tablet 2    multivitamin with minerals (HAIR,SKIN AND NAILS ORAL) Hair skin and nails supplement      naloxone (Narcan) 4 mg/0.1 mL nasal spray Administer into affected nostril(s).      oxyCODONE-acetaminophen (Percocet)  mg tablet Take 1 tablet by mouth every 6 hours if needed for severe pain (7 - 10) for up to 28 days. 100 tabs fro 28 days. Do not fill before April 18, 2025. 100 tablet 0    [START ON 5/16/2025] oxyCODONE-acetaminophen (Percocet)  mg tablet Take 1 tablet by mouth every 6 hours if needed for severe pain (7 - 10) for up to 28 days. 100 tabs fro 28 days. Do not fill before May 16, 2025. 100 tablet 0    pantoprazole (ProtoNix) 40 mg EC tablet TAKE 1 TABLET DAILY 90 tablet 2    tiZANidine (Zanaflex) 2 mg tablet Take 1 tablet (2 mg) by mouth every 12 hours if needed for muscle spasms. (Patient not taking: Reported on 4/15/2025) 60 tablet 1     No current facility-administered medications for this visit.       Objective   Visit Vitals  OB Status Unknown   Smoking Status Some Days     Physical Exam  Constitutional:       General: He is not in acute distress.     Appearance: Normal appearance.   HENT:      Head: Normocephalic and atraumatic.      Nose: Nose normal.   Eyes:      Extraocular Movements: Extraocular movements intact.      Conjunctiva/sclera: Conjunctivae  normal.   Cardiovascular:      Rate and Rhythm: Normal rate and regular rhythm.   Pulmonary:      Effort: Pulmonary effort is normal.      Breath sounds: Normal breath sounds.   Skin:     General: Skin is warm.   Neurological:      Mental Status: He is alert and oriented to person, place, and time.   Psychiatric:         Mood and Affect: Mood normal.         Behavior: Behavior normal.   Immunization History   Administered Date(s) Administered    Flu vaccine, trivalent, preservative free, HIGH-DOSE, age 65y+ (Fluzone) 09/09/2024    Moderna COVID-19 vaccine, bivalent, blue cap/gray label *Check age/dose* 12/20/2022    Moderna SARS-CoV-2 Vaccination 03/03/2021, 03/31/2021    Pneumococcal conjugate vaccine, 13-valent (PREVNAR 13) 09/04/2019    Pneumococcal polysaccharide vaccine, 23-valent, age 2 years and older (PNEUMOVAX 23) 08/29/2014, 02/16/2021    Td vaccine, age 7 years and older (TDVAX) 10/26/2011, 03/11/2016    Td vaccine, age 7 years and older (TENIVAC) 03/11/2016    Zoster vaccine, recombinant, adult (SHINGRIX) 09/04/2019       Review of Systems    No visits with results within 4 Month(s) from this visit.   Latest known visit with results is:   Admission on 12/03/2024, Discharged on 12/03/2024   Component Date Value Ref Range Status    WBC 12/02/2024 6.7  4.4 - 11.3 x10*3/uL Final    nRBC 12/02/2024 0.0  0.0 - 0.0 /100 WBCs Final    RBC 12/02/2024 3.95 (L)  4.00 - 5.20 x10*6/uL Final    Hemoglobin 12/02/2024 11.9 (L)  12.0 - 16.0 g/dL Final    Hematocrit 12/02/2024 37.2  36.0 - 46.0 % Final    MCV 12/02/2024 94  80 - 100 fL Final    MCH 12/02/2024 30.1  26.0 - 34.0 pg Final    MCHC 12/02/2024 32.0  32.0 - 36.0 g/dL Final    RDW 12/02/2024 13.5  11.5 - 14.5 % Final    Platelets 12/02/2024 177  150 - 450 x10*3/uL Final    Neutrophils % 12/02/2024 47.7  40.0 - 80.0 % Final    Immature Granulocytes %, Automated 12/02/2024 0.1  0.0 - 0.9 % Final    Lymphocytes % 12/02/2024 43.1  13.0 - 44.0 % Final    Monocytes %  12/02/2024 6.5  2.0 - 10.0 % Final    Eosinophils % 12/02/2024 2.2  0.0 - 6.0 % Final    Basophils % 12/02/2024 0.4  0.0 - 2.0 % Final    Neutrophils Absolute 12/02/2024 3.20  1.60 - 5.50 x10*3/uL Final    Immature Granulocytes Absolute, Au* 12/02/2024 0.01  0.00 - 0.50 x10*3/uL Final    Lymphocytes Absolute 12/02/2024 2.90  0.80 - 3.00 x10*3/uL Final    Monocytes Absolute 12/02/2024 0.44  0.05 - 0.80 x10*3/uL Final    Eosinophils Absolute 12/02/2024 0.15  0.00 - 0.40 x10*3/uL Final    Basophils Absolute 12/02/2024 0.03  0.00 - 0.10 x10*3/uL Final    Glucose 12/02/2024 162 (H)  74 - 99 mg/dL Final    Sodium 12/02/2024 142  136 - 145 mmol/L Final    Potassium 12/02/2024 3.0 (L)  3.5 - 5.3 mmol/L Final    Chloride 12/02/2024 103  98 - 107 mmol/L Final    Bicarbonate 12/02/2024 31  21 - 32 mmol/L Final    Anion Gap 12/02/2024 11  10 - 20 mmol/L Final    Urea Nitrogen 12/02/2024 21  6 - 23 mg/dL Final    Creatinine 12/02/2024 1.02  0.50 - 1.05 mg/dL Final    eGFR 12/02/2024 57 (L)  >60 mL/min/1.73m*2 Final    Calcium 12/02/2024 8.3 (L)  8.6 - 10.3 mg/dL Final    Magnesium 12/02/2024 0.90 (L)  1.60 - 2.40 mg/dL Final    Ventricular Rate 12/02/2024 63  BPM Final    QRS Duration 12/02/2024 86  ms Final    QT Interval 12/02/2024 430  ms Final    QTC Calculation(Bazett) 12/02/2024 440  ms Final    R Axis 12/02/2024 84  degrees Final    T Axis 12/02/2024 62  degrees Final    QRS Count 12/02/2024 10  beats Final    Q Onset 12/02/2024 223  ms Final    T Offset 12/02/2024 438  ms Final    QTC Fredericia 12/02/2024 437  ms Final    Ventricular Rate 12/03/2024 68  BPM Final    QRS Duration 12/03/2024 80  ms Final    QT Interval 12/03/2024 460  ms Final    QTC Calculation(Bazett) 12/03/2024 489  ms Final    R Axis 12/03/2024 81  degrees Final    T Axis 12/03/2024 75  degrees Final    QRS Count 12/03/2024 11  beats Final    Q Onset 12/03/2024 224  ms Final    T Offset 12/03/2024 454  ms Final    QTC Fredericia 12/03/2024 479  ms  Final       Radiology: Reviewed imaging in powerchart.  No results found.    Family History   Problem Relation Name Age of Onset    Diabetes Mother      Hypertension Mother      Stroke Father       Social History     Socioeconomic History    Marital status:    Tobacco Use    Smoking status: Some Days     Current packs/day: 0.50     Average packs/day: 0.5 packs/day for 30.0 years (15.0 ttl pk-yrs)     Types: Cigarettes    Smokeless tobacco: Never    Tobacco comments:     Smoking here and there again but not every day   Substance and Sexual Activity    Alcohol use: Never    Drug use: Yes     Types: Codeine    Sexual activity: Defer     Past Medical History:   Diagnosis Date    Personal history of other diseases of the circulatory system     History of hypertension     Past Surgical History:   Procedure Laterality Date    BACK SURGERY  01/23/2018    Back Surgery    COLON SURGERY  07/09/2014    Colon Surgery    OTHER SURGICAL HISTORY  02/11/2019    Endoscopy       Charting was completed using voice recognition technology and may include unintended errors.

## 2025-05-08 ENCOUNTER — TELEPHONE (OUTPATIENT)
Dept: PRIMARY CARE | Facility: CLINIC | Age: 75
End: 2025-05-08
Payer: MEDICARE

## 2025-05-08 DIAGNOSIS — I50.32 CHRONIC DIASTOLIC CONGESTIVE HEART FAILURE: ICD-10-CM

## 2025-05-08 DIAGNOSIS — R60.0 PERIPHERAL EDEMA: ICD-10-CM

## 2025-05-09 RX ORDER — FUROSEMIDE 20 MG/1
20 TABLET ORAL DAILY
Qty: 30 TABLET | Refills: 0 | Status: SHIPPED | OUTPATIENT
Start: 2025-05-09 | End: 2025-06-08

## 2025-05-13 ENCOUNTER — TELEPHONE (OUTPATIENT)
Dept: PRIMARY CARE | Facility: CLINIC | Age: 75
End: 2025-05-13
Payer: MEDICARE

## 2025-05-13 ENCOUNTER — TELEPHONE (OUTPATIENT)
Dept: PRIMARY CARE | Facility: CLINIC | Age: 75
End: 2025-05-13

## 2025-05-13 DIAGNOSIS — E07.9 THYROID DISORDER: ICD-10-CM

## 2025-05-13 RX ORDER — LEVOTHYROXINE SODIUM 75 UG/1
75 TABLET ORAL DAILY
Qty: 90 TABLET | Refills: 2 | Status: SHIPPED | OUTPATIENT
Start: 2025-05-13

## 2025-05-13 NOTE — TELEPHONE ENCOUNTER
Patient called to qasim miranda know she needs to wait 1 week for her echo because she needs a prior auth.  She is also asking for a call back from ruth please

## 2025-05-28 ENCOUNTER — APPOINTMENT (OUTPATIENT)
Dept: CARDIOLOGY | Facility: CLINIC | Age: 75
End: 2025-05-28
Payer: MEDICARE

## 2025-06-05 ENCOUNTER — TELEPHONE (OUTPATIENT)
Dept: PRIMARY CARE | Facility: CLINIC | Age: 75
End: 2025-06-05
Payer: MEDICARE

## 2025-06-05 NOTE — TELEPHONE ENCOUNTER
Patient Education   Discharge instruction to follow: Activity: Pelvis rest for 6 weeks     No heavy lifting over 15 lbs for 2 weeks     No driving for 2 weeks     No push/pull motion such as sweeping or vacuuming for 2 weeks     No tub baths for 6 weeks     section keep incision clean and dry, may shower as normal with soap and water. Inspect incision every day for signs of infection listed below. Continue to use howard-bottle with every void or bowel movement until comfortable stopping. Change sanitary pad after each urination or bowel movement. Call MD for the following:      Fever over 101 F; pain not relieved by medication; foul smelling vaginal discharge or increase in vaginal bleeding. Redness, swelling, or drainage from  incision. Take medication as prescribed. Follow up with MD as order.  Section: What to Expect at [address] [place] [address]     A  section, or , is surgery to deliver your baby through a cut, called an incision, that the doctor makes in your lower belly and uterus. You may have some pain in your lower belly and need pain medicine for 1 to 2 weeks. You can expect some vaginal bleeding for several weeks. You will probably need about 6 weeks to fully recover. It is important to take it easy while the incision is healing. Avoid heavy lifting, strenuous activities, or exercises that strain the belly muscles while you are recovering. Ask a family member or friend for help with housework, cooking, and shopping. This care sheet gives you a general idea about how long it will take for you to recover. But each person recovers at a different pace. Follow the steps below to get better as quickly as possible. How can you care for yourself at home? Activity  · Rest when you feel tired. Getting enough sleep will help you recover. · Try to walk each day. Start by walking a little more than you did the day before. Bit by bit, increase the amount you walk. Patient cannot have her echo until the end of June.   Walking boosts blood flow and helps prevent pneumonia, constipation, and blood clots. · Avoid strenuous activities, such as bicycle riding, jogging, weightlifting, and aerobic exercise, for 6 weeks or until your doctor says it is okay. · Until your doctor says it is okay, do not lift anything heavier than your baby. · Do not do sit-ups or other exercises that strain the belly muscles for 6 weeks or until your doctor says it is okay. · Hold a pillow over your incision when you cough or take deep breaths. This will support your belly and decrease your pain. · You may shower as usual. Pat the incision dry when you are done. · You will have some vaginal bleeding. Wear sanitary pads. Do not douche or use tampons until your doctor says it is okay. · Ask your doctor when you can drive again. · You will probably need to take at least 6 weeks off work. It depends on the type of work you do and how you feel. · Ask your doctor when it is okay for you to have sex. Diet  · You can eat your normal diet. If your stomach is upset, try bland, low-fat foods like plain rice, broiled chicken, toast, and yogurt. · Drink plenty of fluids (unless your doctor tells you not to). · You may notice that your bowel movements are not regular right after your surgery. This is common. Try to avoid constipation and straining with bowel movements. You may want to take a fiber supplement every day. If you have not had a bowel movement after a couple of days, ask your doctor about taking a mild laxative. · If you are breastfeeding, limit alcohol. Alcohol can cause a lack of energy and other health problems for the baby when a breastfeeding woman drinks heavily. It can also get in the way of a mom's ability to feed her baby or to care for the child in other ways. There isn't a lot of research about exactly how much alcohol can harm a baby. Having no alcohol is the safest choice for your baby.  If you choose to have a drink now and then, have only one drink, and limit the number of occasions that you have a drink. Wait to breastfeed at least 2 hours after you have a drink to reduce the amount of alcohol the baby may get in the milk. Medicines  · Your doctor will tell you if and when you can restart your medicines. He or she will also give you instructions about taking any new medicines. · If you take aspirin or some other blood thinner, ask your doctor if and when to start taking it again. Make sure that you understand exactly what your doctor wants you to do. · Take pain medicines exactly as directed. ? If the doctor gave you a prescription medicine for pain, take it as prescribed. ? If you are not taking a prescription pain medicine, ask your doctor if you can take an over-the-counter medicine. · If you think your pain medicine is making you sick to your stomach:  ? Take your medicine after meals (unless your doctor has told you not to). ? Ask your doctor for a different pain medicine. · If your doctor prescribed antibiotics, take them as directed. Do not stop taking them just because you feel better. You need to take the full course of antibiotics. Incision care  · If you have strips of tape on the incision, leave the tape on for a week or until it falls off. · Wash the area daily with warm, soapy water, and pat it dry. Don't use hydrogen peroxide or alcohol, which can slow healing. You may cover the area with a gauze bandage if it weeps or rubs against clothing. Change the bandage every day. · Keep the area clean and dry. Other instructions  · If you breastfeed your baby, you may be more comfortable while you are healing if you place the baby so that he or she is not resting on your belly. Try tucking your baby under your arm, with his or her body along the side you will be feeding on. Support your baby's upper body with your arm.  With that hand you can control your baby's head to bring his or her mouth to your breast. This is sometimes called the football hold. Follow-up care is a key part of your treatment and safety. Be sure to make and go to all appointments, and call your doctor if you are having problems. It's also a good idea to know your test results and keep a list of the medicines you take. When should you call for help? Call  911 anytime you think you may need emergency care. For example, call if:  · You have thoughts of harming yourself, your baby, or another person. · You passed out (lost consciousness). · You have chest pain, are short of breath, or cough up blood. · You have a seizure. Call your doctor now or seek immediate medical care if:  · You have pain that does not get better after you take pain medicine. · You have severe vaginal bleeding. · You are dizzy or lightheaded, or you feel like you may faint. · You have new or worse pain in your belly or pelvis. · You have loose stitches, or your incision comes open. · You have symptoms of infection, such as:  ? Increased pain, swelling, warmth, or redness. ? Red streaks leading from the incision. ? Pus draining from the incision. ? A fever. · You have symptoms of a blood clot in your leg (called a deep vein thrombosis), such as:  ? Pain in your calf, back of the knee, thigh, or groin. ? Redness and swelling in your leg or groin. · You have signs of preeclampsia, such as:  ? Sudden swelling of your face, hands, or feet. ? New vision problems (such as dimness, blurring, or seeing spots). ? A severe headache. Watch closely for changes in your health, and be sure to contact your doctor if:  · You do not get better as expected. Where can you learn more? Go to http://katt-galen.info/  Enter M806 in the search box to learn more about \" Section: What to Expect at Home. \"  Current as of: 2020               Content Version: 12.5  © 6049-0722 [employer].    Care instructions adapted under license by [employer] (which disclaims liability or warranty for this information). If you have questions about a medical condition or this instruction, always ask your healthcare professional. [address] any warranty or liability for your use of this information.

## 2025-06-06 ENCOUNTER — TELEPHONE (OUTPATIENT)
Dept: PRIMARY CARE | Facility: CLINIC | Age: 75
End: 2025-06-06
Payer: MEDICARE

## 2025-06-06 DIAGNOSIS — E83.42 HYPOMAGNESEMIA: ICD-10-CM

## 2025-06-06 DIAGNOSIS — E87.6 LOW SERUM POTASSIUM: ICD-10-CM

## 2025-06-06 DIAGNOSIS — I50.32 CHRONIC DIASTOLIC CONGESTIVE HEART FAILURE: Primary | ICD-10-CM

## 2025-06-09 ENCOUNTER — APPOINTMENT (OUTPATIENT)
Dept: PAIN MEDICINE | Facility: CLINIC | Age: 75
End: 2025-06-09
Payer: MEDICARE

## 2025-06-09 DIAGNOSIS — M54.32 BILATERAL SCIATICA: ICD-10-CM

## 2025-06-09 DIAGNOSIS — M51.369 ANNULAR TEAR OF LUMBAR DISC: ICD-10-CM

## 2025-06-09 DIAGNOSIS — M54.31 BILATERAL SCIATICA: ICD-10-CM

## 2025-06-09 DIAGNOSIS — Z79.891 LONG TERM CURRENT USE OF OPIATE ANALGESIC: Primary | ICD-10-CM

## 2025-06-09 DIAGNOSIS — M50.30 DEGENERATION OF INTERVERTEBRAL DISC OF CERVICAL REGION: ICD-10-CM

## 2025-06-09 DIAGNOSIS — N18.31 CHRONIC KIDNEY DISEASE, STAGE 3A (MULTI): ICD-10-CM

## 2025-06-09 DIAGNOSIS — M51.369 DEGENERATION OF INTERVERTEBRAL DISC OF LUMBAR REGION: ICD-10-CM

## 2025-06-09 DIAGNOSIS — M51.26 LUMBAR HERNIATED DISC: ICD-10-CM

## 2025-06-09 PROCEDURE — 99214 OFFICE O/P EST MOD 30 MIN: CPT | Performed by: PHYSICAL MEDICINE & REHABILITATION

## 2025-06-09 PROCEDURE — G2211 COMPLEX E/M VISIT ADD ON: HCPCS | Performed by: PHYSICAL MEDICINE & REHABILITATION

## 2025-06-09 RX ORDER — OXYCODONE AND ACETAMINOPHEN 10; 325 MG/1; MG/1
1 TABLET ORAL EVERY 6 HOURS PRN
Qty: 100 TABLET | Refills: 0 | Status: SHIPPED | OUTPATIENT
Start: 2025-07-11 | End: 2025-08-08

## 2025-06-09 RX ORDER — OXYCODONE AND ACETAMINOPHEN 10; 325 MG/1; MG/1
1 TABLET ORAL EVERY 6 HOURS PRN
Qty: 100 TABLET | Refills: 0 | Status: SHIPPED | OUTPATIENT
Start: 2025-06-13 | End: 2025-07-11

## 2025-06-09 RX ORDER — GABAPENTIN 300 MG/1
300 CAPSULE ORAL 3 TIMES DAILY
Qty: 90 CAPSULE | Refills: 1 | Status: SHIPPED | OUTPATIENT
Start: 2025-06-09 | End: 2025-07-09

## 2025-06-09 NOTE — PROGRESS NOTES
Chief complaint  Back and lower limbs pain      Abbyedward HELEN SEGUNDO,   was present during the entire history and physical examination    History  Abi Weston is back for pain management office visit  She is controlling the pain addressed with medication and home exercise program.  For the aggravation she gets epidural steroid injection.  She continue to try to work with supportive family she is working to cleaning jobs.  This is taking a toll on her back.  The pain medication are helping her.  She does not want to consider surgery because that will put her out and it will affect her income totally.  She is working to support herself and her daughter as well.  The pain in the back is deep achy stabbing across the back area that wake her up at night it does radiate to the lower limbs worse on the left side lately no bowel or bladder incontinence.  We have been trying to cut back slowly on the medication as she can tolerate.  She has reached a plateau with a lower effective dose we will try again in the summer  We discussed with her about cutting back on the medication will help decrease the chronic pain anxiety and depression.    Pain level without medication is 8/10 , with the medication pain level 2-3/10.     Pain disability index (PDI) improvement   across different functional categories, with the pain control with the meds. Forms filled by patient are scanned in the chart    The pain meds are helping control the pain and improving Activities of Daily living and quality of life and quality of sleep.    opioids treatment agreement Jan 2025    Pill count today, using count tray, and in front of patient, Nurse and myself :  8    pills , last fill was on 5/16  for 100 tabs,  the meds pill count today is correct  Oarrs pulled and reviewed, no concerns  last urine toxicology testing was compliant this was done on :  12.2024  .    Xray updated spine  ORT (opioid risk tool) score is 0  Pain pathology and pain generators  "spine  Modalities tried injection, surgery, physical therapy, TENS unit, nonsteroidal anti-inflammatory medication       Review of Systems :  Denied any fever or chills. No weight loss and no night sweats. No cough or sputum production. No diarrhea   The constipation has been responding to fibers and over the counter medications.     No bladder and bowel incontinence and no other changes in bladder and bowel. No skin changes.  Reports tiredness and fatigability only if the pain is not controlled.     I further Asked about symptoms or changes affecting the vision, hearing, breathing, digestive system, urinary symptoms, skin, other musculoskeletal condition, neurological conditions these are negative except as detailed in the history and physical examination above    Denied opioids diversion and abuse and denies alcoholism. Denies overuse of the pain medications.  No reported euphoria sensation or getting a \"high\" on the pain medications.    The control of the pain with the pain medications is helping the control of the symptoms and allowing the function and activities of daily living, enjoyment of life, improving the quality of life and sleep with less interruption by the pain. The goal is symptomatic control of the nonmalignant chronic pain and not to repair the permanent damage in the tissues inducing the chronic pain conditions. We are aiming to shift the focus from the nonmalignant chronic pain to other aspects of life by symptomatically treating this chronic pain. If this pain is not treated it will lead to major morbidity and it is also associated with increased risks of mortality. The patient understands those very clearly and also understand high risks of morbidity and mortality if not strictly adherent to the treatment recommendations and reporting any associated side effects. Also patient understand the full responsibility associated with these medications to avoid abuse or overuse or any use of these " medications for anything besides treating the patient's own chronic pain and nothing else under any circumstances.        Physical examination  Awake, alert and oriented for time place and persons   Pupils are equal and reactive to light and accommodation    Walking with slight limp no cane no assistive device right leg raising increase the pain in the back down the left leg decree sensory to light touch on the lateral aspect of the leg.  Ledy testing are negative.  Plantar cutaneous reflexes are downgoing.  Big toe extension is 4+/5 this is a chronic finding    Diagnosis  Problem List Items Addressed This Visit       Lumbar herniated disc    Relevant Medications    oxyCODONE-acetaminophen (Percocet)  mg tablet (Start on 6/13/2025)    gabapentin (Neurontin) 300 mg capsule    oxyCODONE-acetaminophen (Percocet)  mg tablet (Start on 7/11/2025)    Degeneration of intervertebral disc of cervical region    Relevant Medications    oxyCODONE-acetaminophen (Percocet)  mg tablet (Start on 6/13/2025)    gabapentin (Neurontin) 300 mg capsule    oxyCODONE-acetaminophen (Percocet)  mg tablet (Start on 7/11/2025)    Degeneration of intervertebral disc of lumbar region    Relevant Medications    oxyCODONE-acetaminophen (Percocet)  mg tablet (Start on 6/13/2025)    gabapentin (Neurontin) 300 mg capsule    oxyCODONE-acetaminophen (Percocet)  mg tablet (Start on 7/11/2025)    Sciatica    Relevant Medications    oxyCODONE-acetaminophen (Percocet)  mg tablet (Start on 6/13/2025)    gabapentin (Neurontin) 300 mg capsule    oxyCODONE-acetaminophen (Percocet)  mg tablet (Start on 7/11/2025)    Annular tear of lumbar disc    Relevant Medications    oxyCODONE-acetaminophen (Percocet)  mg tablet (Start on 6/13/2025)    gabapentin (Neurontin) 300 mg capsule    oxyCODONE-acetaminophen (Percocet)  mg tablet (Start on 7/11/2025)     Other Visit Diagnoses         Long term current use  of opiate analgesic    -  Primary    Relevant Orders    Opiate/Opioid/Benzo Prescription Compliance             Plan  Reviewed the pain generators.  Went over the types of pain with neuropathic and nociceptive and different pathologies and therapeutic modalities. Discussed the mechanism of action of interventions from acupuncture, physical therapy , regular exercises, injections, botox, spinal cord stimulation, and role of surgery     Went over pathology of the intervertebral disc displacement and the anatomical relation to the Nerve roots and relation to the radicular symptoms. Went over treatment modalities with conservative treatment including acupuncture   and epidural steroid injection with fluoroscopy guidance and last resort of surgery    Based on the above findings and the clinical response to the opioids medications and improvement of the activities of daily living, sleep, and work performance. We made this complex decision to continue the opioids therapy in light of the evidence of the patient's responsibility in using the pain medications as prescribed for the nonmalignant chronic pain condition. We discussed about the use of the pain medications to treat the symptoms of chronic nonmalignant pain and we are not trying the repair the permanent damage in the tissues, rather we are trying to control the symptoms induced by the permanent damage to the tissues inducing the chronic pain condition and resulting disability. I explained the difference and discussed it with the patient and stressed the importance of knowing the difference especially because of the potential side effects and the potential addicting effect and habit forming nature of the dangerous drugs we are using to treat the symptoms of the chronic pain.      We discussed that we are prescribing the medications on good laura and legitimate medical reason within the scope of professional medical practice.     We reviewed the side effects and  precautions of opioids prescriptions as discussed in the opioids treatment agreement.    realizes the interaction between the therapeutic classes including the respiratory depression and potential death     Random drug testing   we will submit         Discussed about NSAIDS and I explained about the opioids sparing effect to allow keeping the opioids dose at minimal effective dose.   I went over the potential side effects of the NSAIDS on the gastrointestinal, renal and cardiovascular systems.      I detailed the side effects from the acetaminophen in the medication and made aware of those. I also explained about the cumulative effects on the organs and mainly the liver.     Given the opioids therapy , we discussed about the risk for accidental over dose on the pain medications, either for patient or other household. I went over the mechanism of action and mode of use of the Naloxone according to the  recommendations. I will provide a prescription for a kit.     Focus of Today's Visit :  Consider injection for aggravation  Try to cut back more on the medication  Continue home exercise program    Follow-up 8 weeks or earlier if needed     The level of clinical decision making in this office visit,  is high, given the high risks of complications with the morbidity and mortality due to the fact that acute and chronic pain may pose a threat to life and bodily function, if under treated, poorly treated, or with failure to maintain adequate treatment and timely medical follow up. Additionally over treatment has its own set of complications including overdosing on the pain medications and also the habit forming potentials with the use of the medications used to treat chronic painful conditions including therapeutic classes classified as dangerous medications. Given the serious and fluctuating nature of pain level and instensity with extensive consideration for whenever pain changes, there is always the risk of  prolonged functional impairment requiring close patient monitoring with regular assessments and reassessments and high level medical decision making at every office visit. The amount and complexity of data reviewed is high given the patient clinical presentation, labs,  data, radiology reports, and other tests as discussed during office visits. Pertinent data whether positive or negative were taken in consideration in the process of making this high level medical decision.

## 2025-06-10 DIAGNOSIS — I50.32 CHRONIC DIASTOLIC CONGESTIVE HEART FAILURE: ICD-10-CM

## 2025-06-10 DIAGNOSIS — R60.0 PERIPHERAL EDEMA: ICD-10-CM

## 2025-06-10 RX ORDER — FUROSEMIDE 20 MG/1
20 TABLET ORAL DAILY
Qty: 90 TABLET | Refills: 1 | Status: SHIPPED | OUTPATIENT
Start: 2025-06-10

## 2025-06-11 RX ORDER — FUROSEMIDE 20 MG/1
20 TABLET ORAL DAILY
Qty: 30 TABLET | Refills: 1 | Status: SHIPPED | OUTPATIENT
Start: 2025-06-11 | End: 2026-06-11

## 2025-06-12 ENCOUNTER — TELEPHONE (OUTPATIENT)
Dept: PRIMARY CARE | Facility: CLINIC | Age: 75
End: 2025-06-12
Payer: MEDICARE

## 2025-06-12 NOTE — TELEPHONE ENCOUNTER
Patient requested for her lab order to be mailed to her house. Labs have been printed and placed in an envelope to be sent via mail

## 2025-06-26 ENCOUNTER — TELEPHONE (OUTPATIENT)
Dept: PRIMARY CARE | Facility: CLINIC | Age: 75
End: 2025-06-26
Payer: MEDICARE

## 2025-06-26 DIAGNOSIS — I10 BENIGN ESSENTIAL HYPERTENSION: ICD-10-CM

## 2025-06-26 RX ORDER — METOPROLOL SUCCINATE 25 MG/1
25 TABLET, EXTENDED RELEASE ORAL DAILY
Qty: 90 TABLET | Refills: 2 | Status: SHIPPED | OUTPATIENT
Start: 2025-06-26

## 2025-06-30 ENCOUNTER — TELEPHONE (OUTPATIENT)
Dept: PRIMARY CARE | Facility: CLINIC | Age: 75
End: 2025-06-30
Payer: MEDICARE

## 2025-06-30 NOTE — TELEPHONE ENCOUNTER
Patient would like to know her results from [facility] lab - scanned in.   Also had an echo done last week, informed her the results aren't back yet but will get a call once received.

## 2025-07-02 NOTE — TELEPHONE ENCOUNTER
PT is looking for results        Patient would like to know her results from [facility] - scanned in.   Also had an echo done last week, informed her the results aren't back yet but will get a call once received.

## 2025-07-03 ENCOUNTER — APPOINTMENT (OUTPATIENT)
Dept: ORTHOPEDIC SURGERY | Facility: CLINIC | Age: 75
End: 2025-07-03
Payer: MEDICARE

## 2025-07-03 ENCOUNTER — HOSPITAL ENCOUNTER (OUTPATIENT)
Dept: RADIOLOGY | Facility: CLINIC | Age: 75
Discharge: HOME | End: 2025-07-03
Payer: MEDICARE

## 2025-07-03 DIAGNOSIS — M25.511 RIGHT SHOULDER PAIN, UNSPECIFIED CHRONICITY: ICD-10-CM

## 2025-07-03 DIAGNOSIS — M19.011 ARTHRITIS OF RIGHT SHOULDER: Primary | ICD-10-CM

## 2025-07-03 PROCEDURE — 73030 X-RAY EXAM OF SHOULDER: CPT | Mod: RT

## 2025-07-03 PROCEDURE — 73030 X-RAY EXAM OF SHOULDER: CPT | Mod: RIGHT SIDE | Performed by: RADIOLOGY

## 2025-07-03 NOTE — PROGRESS NOTES
Chief complaint is fairly spontaneous right shoulder swelling she is not on blood thinners no history of trauma she cleans houses for living  No numbness or tingling no fevers or chills      See intake sheet which was reviewed and scanned in the chart      Constitutional: Well-developed well-nourished   Eyes: Sclerae anicteric, pupils equal and round  HENT: Normocephalic atraumatic  Cardiovascular: Pulses full, regular rate and rhythm  Respiratory: Breathing not labored, no wheezing  Integumentary: Skin intact, no lesions or rashes  Neurological: Sensation intact, no gross strength deficits, reflexes equal  Psychiatric: Alert oriented and appropriate  Hematologic/lymphatic: No lymphadenopathy  Right shoulder is swollen with anterior bruising she has moderate restriction mobility in all planes because of pain  There is significant crepitus    Strength testing is equivocal because of discomfort    X-rays show fairly advanced arthritis of the shoulder        Impression shoulder arthritis hemarthrosis aspirated about 10 cc of bloody fluid recommended ice and sling immobilization follow-up as needed  L Inj/Asp: R glenohumeral on 7/3/2025 2:14 PM  Indications: pain  Details: 18 G needle, anterior approach  Aspirate: 20 mL bloody

## 2025-07-09 ENCOUNTER — TELEPHONE (OUTPATIENT)
Dept: PRIMARY CARE | Facility: CLINIC | Age: 75
End: 2025-07-09

## 2025-08-04 ENCOUNTER — APPOINTMENT (OUTPATIENT)
Dept: PAIN MEDICINE | Facility: CLINIC | Age: 75
End: 2025-08-04
Payer: MEDICARE

## 2025-08-04 DIAGNOSIS — M50.30 DEGENERATION OF INTERVERTEBRAL DISC OF CERVICAL REGION: ICD-10-CM

## 2025-08-04 DIAGNOSIS — M51.369 ANNULAR TEAR OF LUMBAR DISC: ICD-10-CM

## 2025-08-04 DIAGNOSIS — M54.31 BILATERAL SCIATICA: ICD-10-CM

## 2025-08-04 DIAGNOSIS — M51.369 DEGENERATION OF INTERVERTEBRAL DISC OF LUMBAR REGION: ICD-10-CM

## 2025-08-04 DIAGNOSIS — M51.26 LUMBAR HERNIATED DISC: ICD-10-CM

## 2025-08-04 DIAGNOSIS — M54.32 BILATERAL SCIATICA: ICD-10-CM

## 2025-08-04 PROCEDURE — 99214 OFFICE O/P EST MOD 30 MIN: CPT | Performed by: PHYSICAL MEDICINE & REHABILITATION

## 2025-08-04 PROCEDURE — G2211 COMPLEX E/M VISIT ADD ON: HCPCS | Performed by: PHYSICAL MEDICINE & REHABILITATION

## 2025-08-04 PROCEDURE — 1160F RVW MEDS BY RX/DR IN RCRD: CPT | Performed by: PHYSICAL MEDICINE & REHABILITATION

## 2025-08-04 PROCEDURE — 1159F MED LIST DOCD IN RCRD: CPT | Performed by: PHYSICAL MEDICINE & REHABILITATION

## 2025-08-04 RX ORDER — GABAPENTIN 300 MG/1
300 CAPSULE ORAL 3 TIMES DAILY
Qty: 90 CAPSULE | Refills: 1 | Status: SHIPPED | OUTPATIENT
Start: 2025-08-04 | End: 2025-09-03

## 2025-08-04 RX ORDER — OXYCODONE AND ACETAMINOPHEN 10; 325 MG/1; MG/1
1 TABLET ORAL EVERY 6 HOURS PRN
Qty: 100 TABLET | Refills: 0 | Status: SHIPPED | OUTPATIENT
Start: 2025-09-05 | End: 2025-10-03

## 2025-08-04 RX ORDER — OXYCODONE AND ACETAMINOPHEN 10; 325 MG/1; MG/1
1 TABLET ORAL EVERY 6 HOURS PRN
Qty: 100 TABLET | Refills: 0 | Status: SHIPPED | OUTPATIENT
Start: 2025-08-08 | End: 2025-09-05

## 2025-08-04 NOTE — PROGRESS NOTES
Chief complaint  Back pain getting worse  R shoulder      Verenda E LPN,   was present during the entire history and physical examination    History  Abi Weston is back for pain management office visit  Having aggravation  of lower back pain. The pain is interfering with activities of daily living, quality of life and quality of sleep. It is limiting the functions and everything takes longer to complete because of the slowing related to the pain. Movements are cautious to avoid aggravation of the symptoms.   The symptoms appeared spontaneously without history intervening trauma or falls.   Intermittent radiation to the lower limbs. But the back ain is the worst  No bowel and bladder incontinence   waking up at night to change position and adjust the posture in order to try and relief the pain before being able to go back to sleep.  No changes in weight or appetitie and not bowel and bladder changes    Will need MRI of hte back    Also R shoulder limited ROM saw ortho and considering TSR    Pain level without medication is 9/10 , with the medication pain level 5/10. Bc of hte aggravation      Pain disability index (PDI) improvement   across different functional categories, with the pain control with the meds. Forms filled by patient are scanned in the chart    The pain meds are helping control the pain and improving Activities of Daily living and quality of life and quality of sleep.    opioids treatment agreement Jan 2025    Pill count today, using count tray, and in front of patient, Nurse and myself :  9    pills , last fill was on 7/11  for 100 tabs,  the meds pill count today is correct  Oarrs pulled and reviewed, no concerns  last urine toxicology testing was compliant this was done on : April 2025  Xray updated spine need MRI   ORT (opioid risk tool) score is  0  Pain pathology and pain generators spine  and R shoulder   Modalities tried injection, surgery, physical therapy, TENS unit, nonsteroidal  "anti-inflammatory medication       Review of Systems :  Denied any fever or chills. No weight loss and no night sweats. No cough or sputum production. No diarrhea   The constipation has been responding to fibers and over the counter medications.     No bladder and bowel incontinence and no other changes in bladder and bowel. No skin changes.  Reports tiredness and fatigability only if the pain is not controlled.     I further Asked about symptoms or changes affecting the vision, hearing, breathing, digestive system, urinary symptoms, skin, other musculoskeletal condition, neurological conditions these are negative except as detailed in the history and physical examination above    Denied opioids diversion and abuse and denies alcoholism. Denies overuse of the pain medications.  No reported euphoria sensation or getting a \"high\" on the pain medications.    The control of the pain with the pain medications is helping the control of the symptoms and allowing the function and activities of daily living, enjoyment of life, improving the quality of life and sleep with less interruption by the pain. The goal is symptomatic control of the nonmalignant chronic pain and not to repair the permanent damage in the tissues inducing the chronic pain conditions. We are aiming to shift the focus from the nonmalignant chronic pain to other aspects of life by symptomatically treating this chronic pain. If this pain is not treated it will lead to major morbidity and it is also associated with increased risks of mortality. The patient understands those very clearly and also understand high risks of morbidity and mortality if not strictly adherent to the treatment recommendations and reporting any associated side effects. Also patient understand the full responsibility associated with these medications to avoid abuse or overuse or any use of these medications for anything besides treating the patient's own chronic pain and nothing else " under any circumstances.        Physical examination  Awake, alert and oriented for time place and persons   Pupils are equal and reactive to light and accommodation    Tender to percussion to the back  SLR increaed back pain  plantar cutaneous reflex are down going bilaterally     Diagnosis  Problem List Items Addressed This Visit       Lumbar herniated disc    Relevant Medications    oxyCODONE-acetaminophen (Percocet)  mg tablet (Start on 8/8/2025)    oxyCODONE-acetaminophen (Percocet)  mg tablet (Start on 9/5/2025)    gabapentin (Neurontin) 300 mg capsule    Other Relevant Orders    MR lumbar spine w IV contrast    BUN    Creatinine    Degeneration of intervertebral disc of cervical region    Relevant Medications    oxyCODONE-acetaminophen (Percocet)  mg tablet (Start on 8/8/2025)    oxyCODONE-acetaminophen (Percocet)  mg tablet (Start on 9/5/2025)    gabapentin (Neurontin) 300 mg capsule    Degeneration of intervertebral disc of lumbar region    Relevant Medications    oxyCODONE-acetaminophen (Percocet)  mg tablet (Start on 8/8/2025)    oxyCODONE-acetaminophen (Percocet)  mg tablet (Start on 9/5/2025)    gabapentin (Neurontin) 300 mg capsule    Other Relevant Orders    MR lumbar spine w IV contrast    BUN    Creatinine    XR lumbar spine 4+ views w flexion extension    Sciatica    Relevant Medications    oxyCODONE-acetaminophen (Percocet)  mg tablet (Start on 8/8/2025)    oxyCODONE-acetaminophen (Percocet)  mg tablet (Start on 9/5/2025)    gabapentin (Neurontin) 300 mg capsule    Other Relevant Orders    MR lumbar spine w IV contrast    BUN    Creatinine    Annular tear of lumbar disc    Relevant Medications    oxyCODONE-acetaminophen (Percocet)  mg tablet (Start on 8/8/2025)    oxyCODONE-acetaminophen (Percocet)  mg tablet (Start on 9/5/2025)    gabapentin (Neurontin) 300 mg capsule    Other Relevant Orders    MR lumbar spine w IV contrast    BUN     Creatinine        Plan  Reviewed the pain generators.  Went over the types of pain with neuropathic and nociceptive and different pathologies and therapeutic modalities. Discussed the mechanism of action of interventions from acupuncture, physical therapy , regular exercises, injections, botox, spinal cord stimulation, and role of surgery     Went over pathology of the intervertebral disc displacement and the anatomical relation to the Nerve roots and relation to the radicular symptoms. Went over treatment modalities with conservative treatment including acupuncture   and epidural steroid injection with fluoroscopy guidance and last resort of surgery    Based on the above findings and the clinical response to the opioids medications and improvement of the activities of daily living, sleep, and work performance. We made this complex decision to continue the opioids therapy in light of the evidence of the patient's responsibility in using the pain medications as prescribed for the nonmalignant chronic pain condition. We discussed about the use of the pain medications to treat the symptoms of chronic nonmalignant pain and we are not trying the repair the permanent damage in the tissues, rather we are trying to control the symptoms induced by the permanent damage to the tissues inducing the chronic pain condition and resulting disability. I explained the difference and discussed it with the patient and stressed the importance of knowing the difference especially because of the potential side effects and the potential addicting effect and habit forming nature of the dangerous drugs we are using to treat the symptoms of the chronic pain.      We discussed that we are prescribing the medications on good laura and legitimate medical reason within the scope of professional medical practice.     We reviewed the side effects and precautions of opioids prescriptions as discussed in the opioids treatment agreement.    realizes  the interaction between the therapeutic classes including the respiratory depression and potential death     Random drug testing   we will submit         Discussed about NSAIDS and I explained about the opioids sparing effect to allow keeping the opioids dose at minimal effective dose.   I went over the potential side effects of the NSAIDS on the gastrointestinal, renal and cardiovascular systems.      I detailed the side effects from the acetaminophen in the medication and made aware of those. I also explained about the cumulative effects on the organs and mainly the liver.     Given the opioids therapy , we discussed about the risk for accidental over dose on the pain medications, either for patient or other household. I went over the mechanism of action and mode of use of the Naloxone according to the  recommendations. I will provide a prescription for a kit.     Focus of Today's Visit :  Xray of L spine  MRI  Continue with oxycodone try ctting back again   HEP  Checkon MRI and follow accordingly      Follow-up 8 weeks or earlier if needed     The level of clinical decision making in this office visit,  is high, given the high risks of complications with the morbidity and mortality due to the fact that acute and chronic pain may pose a threat to life and bodily function, if under treated, poorly treated, or with failure to maintain adequate treatment and timely medical follow up. Additionally over treatment has its own set of complications including overdosing on the pain medications and also the habit forming potentials with the use of the medications used to treat chronic painful conditions including therapeutic classes classified as dangerous medications. Given the serious and fluctuating nature of pain level and instensity with extensive consideration for whenever pain changes, there is always the risk of prolonged functional impairment requiring close patient monitoring with regular assessments  and reassessments and high level medical decision making at every office visit. The amount and complexity of data reviewed is high given the patient clinical presentation, labs,  data, radiology reports, and other tests as discussed during office visits. Pertinent data whether positive or negative were taken in consideration in the process of making this high level medical decision.

## 2025-08-06 ENCOUNTER — HOSPITAL ENCOUNTER (OUTPATIENT)
Dept: RADIOLOGY | Facility: CLINIC | Age: 75
Discharge: HOME | End: 2025-08-06
Payer: MEDICARE

## 2025-08-06 DIAGNOSIS — M51.369 DEGENERATION OF INTERVERTEBRAL DISC OF LUMBAR REGION: ICD-10-CM

## 2025-08-06 PROCEDURE — 72120 X-RAY BEND ONLY L-S SPINE: CPT

## 2025-08-06 PROCEDURE — 72110 X-RAY EXAM L-2 SPINE 4/>VWS: CPT | Performed by: RADIOLOGY

## 2025-08-08 DIAGNOSIS — M54.12 CERVICAL RADICULITIS: Primary | ICD-10-CM

## 2025-08-12 ENCOUNTER — TELEPHONE (OUTPATIENT)
Dept: PRIMARY CARE | Facility: CLINIC | Age: 75
End: 2025-08-12
Payer: MEDICARE

## 2025-08-12 DIAGNOSIS — I50.32 CHRONIC DIASTOLIC CONGESTIVE HEART FAILURE: ICD-10-CM

## 2025-08-13 RX ORDER — FUROSEMIDE 20 MG/1
20 TABLET ORAL DAILY
Qty: 30 TABLET | Refills: 1 | Status: SHIPPED | OUTPATIENT
Start: 2025-08-13 | End: 2026-08-13

## 2025-08-22 DIAGNOSIS — E78.2 HYPERLIPIDEMIA, MIXED: ICD-10-CM

## 2025-08-22 RX ORDER — ATORVASTATIN CALCIUM 40 MG/1
40 TABLET, FILM COATED ORAL DAILY
Qty: 90 TABLET | Refills: 3 | Status: SHIPPED | OUTPATIENT
Start: 2025-08-22

## 2025-08-25 DIAGNOSIS — I10 BENIGN ESSENTIAL HYPERTENSION: ICD-10-CM

## 2025-08-25 RX ORDER — HYDRALAZINE HYDROCHLORIDE 25 MG/1
25 TABLET, FILM COATED ORAL 3 TIMES DAILY
Qty: 90 TABLET | Refills: 1 | Status: SHIPPED | OUTPATIENT
Start: 2025-08-25

## 2025-09-02 ENCOUNTER — APPOINTMENT (OUTPATIENT)
Dept: RADIOLOGY | Facility: HOSPITAL | Age: 75
End: 2025-09-02
Payer: MEDICARE

## 2025-09-29 ENCOUNTER — APPOINTMENT (OUTPATIENT)
Dept: PAIN MEDICINE | Facility: CLINIC | Age: 75
End: 2025-09-29
Payer: MEDICARE

## 2025-11-04 ENCOUNTER — APPOINTMENT (OUTPATIENT)
Dept: PRIMARY CARE | Facility: CLINIC | Age: 75
End: 2025-11-04
Payer: MEDICARE